# Patient Record
Sex: FEMALE | Race: WHITE | Employment: FULL TIME | URBAN - METROPOLITAN AREA
[De-identification: names, ages, dates, MRNs, and addresses within clinical notes are randomized per-mention and may not be internally consistent; named-entity substitution may affect disease eponyms.]

---

## 2017-05-04 PROCEDURE — 88175 CYTOPATH C/V AUTO FLUID REDO: CPT | Performed by: OBSTETRICS & GYNECOLOGY

## 2017-11-22 PROCEDURE — 86696 HERPES SIMPLEX TYPE 2 TEST: CPT | Performed by: OBSTETRICS & GYNECOLOGY

## 2017-11-22 PROCEDURE — 36415 COLL VENOUS BLD VENIPUNCTURE: CPT | Performed by: OBSTETRICS & GYNECOLOGY

## 2017-11-22 PROCEDURE — 86695 HERPES SIMPLEX TYPE 1 TEST: CPT | Performed by: OBSTETRICS & GYNECOLOGY

## 2018-06-15 PROCEDURE — 87480 CANDIDA DNA DIR PROBE: CPT | Performed by: PHYSICIAN ASSISTANT

## 2018-06-15 PROCEDURE — 87660 TRICHOMONAS VAGIN DIR PROBE: CPT | Performed by: PHYSICIAN ASSISTANT

## 2018-06-15 PROCEDURE — 87510 GARDNER VAG DNA DIR PROBE: CPT | Performed by: PHYSICIAN ASSISTANT

## 2018-06-28 PROCEDURE — 87480 CANDIDA DNA DIR PROBE: CPT | Performed by: PHYSICIAN ASSISTANT

## 2018-06-28 PROCEDURE — 87660 TRICHOMONAS VAGIN DIR PROBE: CPT | Performed by: PHYSICIAN ASSISTANT

## 2018-06-28 PROCEDURE — 87510 GARDNER VAG DNA DIR PROBE: CPT | Performed by: PHYSICIAN ASSISTANT

## 2018-06-28 PROCEDURE — 87086 URINE CULTURE/COLONY COUNT: CPT | Performed by: PHYSICIAN ASSISTANT

## 2018-06-28 PROCEDURE — 87147 CULTURE TYPE IMMUNOLOGIC: CPT | Performed by: PHYSICIAN ASSISTANT

## 2018-07-06 PROCEDURE — 88175 CYTOPATH C/V AUTO FLUID REDO: CPT | Performed by: OBSTETRICS & GYNECOLOGY

## 2020-12-16 ENCOUNTER — TELEPHONE (OUTPATIENT)
Dept: INTERNAL MEDICINE CLINIC | Facility: HOSPITAL | Age: 30
End: 2020-12-16

## 2020-12-16 ENCOUNTER — NURSE ONLY (OUTPATIENT)
Dept: LAB | Facility: HOSPITAL | Age: 30
End: 2020-12-16
Attending: PREVENTIVE MEDICINE

## 2020-12-16 DIAGNOSIS — Z20.822 SUSPECTED 2019 NOVEL CORONAVIRUS INFECTION: Primary | ICD-10-CM

## 2020-12-16 DIAGNOSIS — Z20.822 SUSPECTED 2019 NOVEL CORONAVIRUS INFECTION: ICD-10-CM

## 2020-12-16 PROCEDURE — 87426 SARSCOV CORONAVIRUS AG IA: CPT

## 2020-12-16 NOTE — TELEPHONE ENCOUNTER
Triage note made due to unfound. Results and RTW guidelines:    COVID RESULT GIVEN:      Test type:    [x] Rapid         []       [x] NEGATIVE     Ordered  retest?  []Yes   [x] No (skip to RTW)       Date ordered:  N/A            Dated to be taken:  N/A    If Yes, PLACE ORDER NOW and instruct the following:  [x] Originally Symptomatic or Now Symptoms: RTW when sx improve- fever free for 24 hours w/o medications, Diarrhea/Vomiting for 24 hours w/o medications     [] Originally  Asymptomatic and continues to be Asymptomatic: Ok to work, continue to monitor for sx  [] Positive        Notes: employee stated that she gets migraines and want to see if this improves. RTW PLAN:    [] RTW 10 days with clearance from [facility]- call for appt 2 days prior to RTW date  [] RTW immediately, continue to monitor for sx  [x] RTW when sx improve- fever free for 24 hours w/o medications, Diarrhea/Vomiting for 24 hours w/o medications  []  ordered; continue to monitor sx and call for new/worsening sx.   Discuss RTW guidelines with manager  [x] May continue to work  [] Follow up with PCP  [] Home until further instruction from hotline with  results  INSTRUCTIONS PROVIDED:  [x]  Plan as noted above  []  Length of time to obtain results  []  Quarantine instructions  []  S/S of worsening infection/condition and importance of prompt medical re-evaluation including when to seek emergency care    Estimated RTW date:  12/16/20    [x] The employee voiced understanding of above plan/instructions  [x] Manager Notified/Labor  Notified, if pertinent

## 2020-12-23 ENCOUNTER — TELEPHONE (OUTPATIENT)
Dept: NEUROLOGY | Facility: CLINIC | Age: 30
End: 2020-12-23

## 2020-12-23 ENCOUNTER — LAB ENCOUNTER (OUTPATIENT)
Dept: LAB | Facility: HOSPITAL | Age: 30
End: 2020-12-23
Attending: FAMILY MEDICINE
Payer: COMMERCIAL

## 2020-12-23 DIAGNOSIS — R42 DIZZINESS: ICD-10-CM

## 2020-12-23 DIAGNOSIS — N91.2 AMENORRHEA: ICD-10-CM

## 2020-12-23 DIAGNOSIS — M54.2 CERVICALGIA: Primary | ICD-10-CM

## 2020-12-23 DIAGNOSIS — M79.18 MYOFASCIAL PAIN: ICD-10-CM

## 2020-12-23 PROCEDURE — 82746 ASSAY OF FOLIC ACID SERUM: CPT

## 2020-12-23 PROCEDURE — 82607 VITAMIN B-12: CPT

## 2020-12-23 PROCEDURE — 36415 COLL VENOUS BLD VENIPUNCTURE: CPT

## 2020-12-23 NOTE — TELEPHONE ENCOUNTER
Wes Hilario has chronic neck pain. Exam reveals paracervical, trap and scalene tenderness.  Rx for PT sent

## 2021-01-05 ENCOUNTER — ORDER TRANSCRIPTION (OUTPATIENT)
Dept: PHYSICAL THERAPY | Facility: HOSPITAL | Age: 31
End: 2021-01-05

## 2021-01-05 DIAGNOSIS — M79.18 MYOFASCIAL PAIN: ICD-10-CM

## 2021-01-05 DIAGNOSIS — M54.2 CERVICALGIA: Primary | ICD-10-CM

## 2021-01-18 ENCOUNTER — TELEPHONE (OUTPATIENT)
Dept: PHYSICAL THERAPY | Facility: HOSPITAL | Age: 31
End: 2021-01-18

## 2021-01-19 ENCOUNTER — APPOINTMENT (OUTPATIENT)
Dept: PHYSICAL THERAPY | Facility: HOSPITAL | Age: 31
End: 2021-01-19
Attending: PHYSICAL MEDICINE & REHABILITATION

## 2021-01-22 ENCOUNTER — APPOINTMENT (OUTPATIENT)
Dept: PHYSICAL THERAPY | Facility: HOSPITAL | Age: 31
End: 2021-01-22
Attending: PHYSICAL MEDICINE & REHABILITATION
Payer: COMMERCIAL

## 2021-01-29 ENCOUNTER — OFFICE VISIT (OUTPATIENT)
Dept: PHYSICAL THERAPY | Facility: HOSPITAL | Age: 31
End: 2021-01-29
Attending: PHYSICAL MEDICINE & REHABILITATION
Payer: COMMERCIAL

## 2021-01-29 DIAGNOSIS — M54.2 CERVICALGIA: ICD-10-CM

## 2021-01-29 DIAGNOSIS — M79.18 MYOFASCIAL PAIN: ICD-10-CM

## 2021-01-29 PROCEDURE — 97530 THERAPEUTIC ACTIVITIES: CPT

## 2021-01-29 PROCEDURE — 97162 PT EVAL MOD COMPLEX 30 MIN: CPT

## 2021-01-29 PROCEDURE — 97112 NEUROMUSCULAR REEDUCATION: CPT

## 2021-01-29 NOTE — PROGRESS NOTES
\"Ruth\" Spidel SPINE EVALUATION:   Referring Physician: Dr. Caty Malin  Diagnosis: Radha Postin B R>L, RUE symptoms with wrist numbness/tingling in 1-4th digits, HA/migraine, TMD R>L, and vertigo    Date of Service: 1/29/2021     PATIENT [address] [facility] pain.    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   OP sitting more, IP was moving more at [place] [address]  Recreation/Activity:Y oga, dada, rock climbing,     B neck pain R>L  • Pt describes symptom level current 0/10, at best 0/10, at worst muscle tightness B UT/suboccipitals. 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   Signs and symptoms are consiste cervical flexor test x   Cervical flexor endurance test 5 sec   Normative data scores in healthy adults - Men: 38.9 seconds, Women: 29.4 seconds     Today’s Treatment and Response:   Pt education was provided on exam findings, treatment diagnosis, treatmen Short Term Goals: (to be met in 4-8 visits)   • Pt will be independent and compliant with comprehensive HEP to maintain progress achieved in PT    · Pt will improve cervical AROM extension  to improve to 100% tolerance for putting dishes into overhead ca the need for these services furnished under this plan of treatment and while under my care.     X___________________________________________________ Date____________________    Certification From: 0/[id number]/6301  To:4/29/2021

## 2021-02-05 ENCOUNTER — OFFICE VISIT (OUTPATIENT)
Dept: PHYSICAL THERAPY | Facility: HOSPITAL | Age: 31
End: 2021-02-05
Attending: PHYSICAL MEDICINE & REHABILITATION
Payer: COMMERCIAL

## 2021-02-05 PROCEDURE — 97140 MANUAL THERAPY 1/> REGIONS: CPT

## 2021-02-05 PROCEDURE — 97112 NEUROMUSCULAR REEDUCATION: CPT

## 2021-02-05 PROCEDURE — 97110 THERAPEUTIC EXERCISES: CPT

## 2021-02-05 NOTE — PROGRESS NOTES
PHYSICAL THERAPY DAILY TREATMENT NOTE  Nba Garner  28year old  female  Diagnosis: Cervicalgia B R>L, RUE symptoms with wrist numbness/tingling in 1-4th digits, HA/migraine, TMD R>L, and vertigo     Insurance (Authorized # of Visits):     [employer] performed with pain)  Flexion: 2 fingers, 1 finger with OP pain in UTs and shoulder blades  Extension: 50%, 100% with OP  Sidebending: R 10%; L 25%  (spurling R local pain)  Rotation: R 75%;  L 75%, OP painfree •  •  •  •  •    NV=next visit, AMRAP= as many standing >30 minutes for work and home activities   Charges: 1TE, 1MT, 1NMR       Total Timed Treatment: 45 min  Total Treatment Time: 45 min

## 2021-02-15 ENCOUNTER — APPOINTMENT (OUTPATIENT)
Dept: PHYSICAL THERAPY | Facility: HOSPITAL | Age: 31
End: 2021-02-15
Attending: PHYSICAL MEDICINE & REHABILITATION
Payer: COMMERCIAL

## 2021-02-19 ENCOUNTER — OFFICE VISIT (OUTPATIENT)
Dept: PHYSICAL THERAPY | Facility: HOSPITAL | Age: 31
End: 2021-02-19
Attending: PHYSICAL MEDICINE & REHABILITATION
Payer: COMMERCIAL

## 2021-02-19 PROCEDURE — 97112 NEUROMUSCULAR REEDUCATION: CPT

## 2021-02-19 PROCEDURE — 97530 THERAPEUTIC ACTIVITIES: CPT

## 2021-02-19 PROCEDURE — 97140 MANUAL THERAPY 1/> REGIONS: CPT

## 2021-02-19 NOTE — PROGRESS NOTES
PHYSICAL THERAPY DAILY TREATMENT NOTE  Lashawn Garner  28year old  female  Diagnosis: Cervicalgia B R>L, RUE symptoms with wrist numbness/tingling in 1-4th digits, HA/migraine, TMD R>L, and vertigo     Insurance (Authorized # of Visits):     [employer] roof of mouth 2x10  • Pushup flat/decline, serratus plus CKC, serratus plus OKC 5# 2x10 ea B •  •  •    Subjective Comparable signs •  •  •  •  •  •    Objective comparable signs • Cervical flex endurance 5 sec  • ttp subox, jaw pain reproduced  • Mid trap putting dishes into overhead cabinets   · Pt will improve cervical AROM rotation to >75% to improve tolerance for turning head to check blind spot while driving  · Pt will report decreased frequency of headaches to <1-3x/week    Long Term Goals (to be met

## 2021-02-26 ENCOUNTER — OFFICE VISIT (OUTPATIENT)
Dept: PHYSICAL THERAPY | Facility: HOSPITAL | Age: 31
End: 2021-02-26
Attending: INTERNAL MEDICINE
Payer: COMMERCIAL

## 2021-02-26 PROCEDURE — 97140 MANUAL THERAPY 1/> REGIONS: CPT

## 2021-02-26 PROCEDURE — 97110 THERAPEUTIC EXERCISES: CPT

## 2021-02-26 NOTE — PROGRESS NOTES
PHYSICAL THERAPY DAILY TREATMENT NOTE  Romelia Angelucci A Spidel  28year old  female  Diagnosis: Cervicalgia B R>L, RUE symptoms with wrist numbness/tingling in 1-4th digits, HA/migraine, TMD R>L, and vertigo     Insurance (Authorized # of Visits):     [employer] endurance 5 sec  • ttp subox, jaw pain reproduced  • Mid trap: R 4/5  • Low trap: R 4/5  • Scratch IR: R 7cm deficit;  • C2 hypo and causing familiar HA to T7 then hypermobile  Cervical AROM: (* denotes performed with pain)  Flexion: 2 fingers, 1 finger wi of headaches to <1-3x/week    Long Term Goals (to be met in 8-14 visits)   · Pt will have improved thoracic PA mobility to WNL to improve cervical ROM as well as promote upright posturing and decreased pain with prolonged sitting at work   · Pt will demons

## 2021-03-05 ENCOUNTER — OFFICE VISIT (OUTPATIENT)
Dept: PHYSICAL THERAPY | Facility: HOSPITAL | Age: 31
End: 2021-03-05
Attending: PHYSICAL MEDICINE & REHABILITATION
Payer: COMMERCIAL

## 2021-03-05 PROCEDURE — 97140 MANUAL THERAPY 1/> REGIONS: CPT

## 2021-03-05 PROCEDURE — 97110 THERAPEUTIC EXERCISES: CPT

## 2021-03-05 NOTE — PROGRESS NOTES
PHYSICAL THERAPY DAILY TREATMENT NOTE  Edin Garner  28year old  female  Diagnosis: Cervicalgia B R>L, RUE symptoms with wrist numbness/tingling in 1-4th digits, HA/migraine, TMD R>L, and vertigo     Insurance (Authorized # of Visits):     [employer] 2x10  • Pushup flat/decline, serratus plus CKC, serratus plus OKC 5# 2x10 ea B •  •  •    Subjective Comparable signs •  •  •  •  •  •    Objective comparable signs • Cervical flex endurance 5 sec  • ttp subox, jaw pain reproduced  • Mid trap: R 4/5  • Low into overhead cabinets   · Pt will improve cervical AROM rotation to >75% to improve tolerance for turning head to check blind spot while driving  · Pt will report decreased frequency of headaches to <1-3x/week    Long Term Goals (to be met in 8-14 visits)

## 2021-03-12 ENCOUNTER — APPOINTMENT (OUTPATIENT)
Dept: PHYSICAL THERAPY | Facility: HOSPITAL | Age: 31
End: 2021-03-12
Attending: PHYSICAL MEDICINE & REHABILITATION
Payer: COMMERCIAL

## 2021-03-26 ENCOUNTER — OFFICE VISIT (OUTPATIENT)
Dept: PHYSICAL THERAPY | Facility: HOSPITAL | Age: 31
End: 2021-03-26
Attending: PHYSICAL MEDICINE & REHABILITATION
Payer: COMMERCIAL

## 2021-03-26 PROCEDURE — 97110 THERAPEUTIC EXERCISES: CPT

## 2021-03-26 PROCEDURE — 97140 MANUAL THERAPY 1/> REGIONS: CPT

## 2021-03-26 NOTE — PROGRESS NOTES
PHYSICAL THERAPY DAILY TREATMENT NOTE  Teetee Garner  28year old  female  Diagnosis: Cervicalgia B R>L, RUE symptoms with wrist numbness/tingling in 1-4th digits, HA/migraine, TMD R>L, and vertigo     Insurance (Authorized # of Visits):     [employer] mob, masseter/temporalis active release  • 28' • SOR, R C2 PPIVMS Gr 3-4  • 20'   Neuromuscular Re-education • DNF endurance  • Chin tucks  • No moneys gtb  • 15' • Unilateral row 2x6-8 9# ea  • pulldown btb 2x15 btb • Restricted opening with tongue roof o (new).  No LBP, LUE clear    Short Term Goals: (to be met in 4-8 visits)   • Pt will be independent and compliant with comprehensive HEP to maintain progress achieved in PT    · Pt will improve cervical AROM extension  to improve to 100% tolerance for putt

## 2021-03-29 ENCOUNTER — APPOINTMENT (OUTPATIENT)
Dept: PHYSICAL THERAPY | Facility: HOSPITAL | Age: 31
End: 2021-03-29
Attending: PHYSICAL MEDICINE & REHABILITATION
Payer: COMMERCIAL

## 2021-04-14 ENCOUNTER — LAB ENCOUNTER (OUTPATIENT)
Dept: LAB | Age: 31
End: 2021-04-14
Attending: PREVENTIVE MEDICINE
Payer: COMMERCIAL

## 2021-04-14 ENCOUNTER — TELEPHONE (OUTPATIENT)
Dept: INTERNAL MEDICINE CLINIC | Facility: HOSPITAL | Age: 31
End: 2021-04-14

## 2021-04-14 DIAGNOSIS — Z20.822 SUSPECTED COVID-19 VIRUS INFECTION: ICD-10-CM

## 2021-04-14 DIAGNOSIS — Z20.822 SUSPECTED COVID-19 VIRUS INFECTION: Primary | ICD-10-CM

## 2021-04-14 NOTE — TELEPHONE ENCOUNTER
Results and RTW guidelines:    COVID RESULT GIVEN:      Test type:    [x] Rapid         [] Alinity      [x] NEGATIVE     Ordered Alinity retest?  []Yes   [x] No    Notes: Cough, Sore throat, laryngitis, voice is coming back .  This is common per pt when

## 2021-04-14 NOTE — TELEPHONE ENCOUNTER
Department: [address] [facility]                           [] [facility]  []DELON   [x] [address] [facility]    Dept Manager/Supervisor/team or clinical lead: Kristel Valdez    Position:  [] MD     [x] RN     [] Respiratory Therapist     [] PCT     [] Other:  Office T 4/13/2021  When are you next scheduled to work? Today, 4/14/21 but called in. Did you have close contact with someone on your unit while not wearing a mask? (e.g., during meal breaks):  Yes []   No [x]    If yes, who:  Do you share a workspace?  Yes [x] instructions  [x]  S/S of worsening infection/condition and importance of prompt medical re-evaluation including when to seek emergency care. [x] The employee voiced understanding    Encouraged to reach out to her PCP for symptom support.

## 2021-04-23 ENCOUNTER — APPOINTMENT (OUTPATIENT)
Dept: PHYSICAL THERAPY | Facility: HOSPITAL | Age: 31
End: 2021-04-23
Attending: PHYSICAL MEDICINE & REHABILITATION
Payer: COMMERCIAL

## 2021-07-14 ENCOUNTER — TELEPHONE (OUTPATIENT)
Dept: INTERNAL MEDICINE CLINIC | Facility: HOSPITAL | Age: 31
End: 2021-07-14

## 2021-07-14 ENCOUNTER — NURSE ONLY (OUTPATIENT)
Dept: LAB | Age: 31
End: 2021-07-14
Attending: PREVENTIVE MEDICINE
Payer: COMMERCIAL

## 2021-07-14 ENCOUNTER — HOSPITAL ENCOUNTER (OUTPATIENT)
Age: 31
Discharge: STILL ADMITTED OR ADMINISTRATIVE | End: 2021-07-14
Attending: PREVENTIVE MEDICINE
Payer: COMMERCIAL

## 2021-07-14 DIAGNOSIS — Z20.822 SUSPECTED COVID-19 VIRUS INFECTION: Primary | ICD-10-CM

## 2021-07-14 DIAGNOSIS — Z20.822 SUSPECTED COVID-19 VIRUS INFECTION: ICD-10-CM

## 2021-07-14 LAB — SARS-COV-2 RNA RESP QL NAA+PROBE: NOT DETECTED

## 2021-07-14 NOTE — TELEPHONE ENCOUNTER
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 days  When was the last shift you worked? 7/14/21 - sent home d/t being ill  When are you next scheduled to work?  7/15/21    Did you have close contact with someone on your unit while not wearing a m and importance of prompt medical re-evaluation including when to seek emergency care.    [x] The employee voiced understanding

## 2021-07-15 NOTE — TELEPHONE ENCOUNTER
Results and RTW guidelines:    COVID RESULT GIVEN:      Test type:    [x] Rapid         [] Alinity      [x] NEGATIVE     Ordered Alinity retest?  []Yes   [x] No (skip to RTW)    Notes: Test results seen in Sammy last evening and Dung returned to w

## 2021-07-23 ENCOUNTER — IMMUNIZATION (OUTPATIENT)
Dept: LAB | Facility: HOSPITAL | Age: 31
End: 2021-07-23
Attending: EMERGENCY MEDICINE
Payer: COMMERCIAL

## 2021-07-23 DIAGNOSIS — Z23 NEED FOR VACCINATION: Primary | ICD-10-CM

## 2021-07-23 PROCEDURE — 0001A SARSCOV2 VAC 30MCG/0.3ML IM: CPT

## 2021-08-13 ENCOUNTER — IMMUNIZATION (OUTPATIENT)
Dept: LAB | Facility: HOSPITAL | Age: 31
End: 2021-08-13
Attending: PREVENTIVE MEDICINE
Payer: COMMERCIAL

## 2021-08-13 DIAGNOSIS — Z23 NEED FOR VACCINATION: Primary | ICD-10-CM

## 2021-08-13 PROCEDURE — 0002A SARSCOV2 VAC 30MCG/0.3ML IM: CPT

## 2021-11-09 ENCOUNTER — APPOINTMENT (OUTPATIENT)
Dept: OTHER | Facility: HOSPITAL | Age: 31
End: 2021-11-09
Attending: ORTHOPAEDIC SURGERY

## 2021-11-10 ENCOUNTER — TELEPHONE (OUTPATIENT)
Dept: PHYSICAL THERAPY | Facility: HOSPITAL | Age: 31
End: 2021-11-10

## 2021-11-10 ENCOUNTER — TELEPHONE (OUTPATIENT)
Dept: PHYSICAL THERAPY | Age: 31
End: 2021-11-10

## 2021-11-10 ENCOUNTER — ORDER TRANSCRIPTION (OUTPATIENT)
Dept: PHYSICAL THERAPY | Facility: HOSPITAL | Age: 31
End: 2021-11-10

## 2021-11-10 DIAGNOSIS — S46.819A TRAPEZIUS STRAIN: ICD-10-CM

## 2021-11-10 DIAGNOSIS — S37.69XA: Primary | ICD-10-CM

## 2021-11-11 ENCOUNTER — OFFICE VISIT (OUTPATIENT)
Dept: PHYSICAL THERAPY | Facility: HOSPITAL | Age: 31
End: 2021-11-11
Attending: ORTHOPAEDIC SURGERY
Payer: OTHER MISCELLANEOUS

## 2021-11-11 ENCOUNTER — APPOINTMENT (OUTPATIENT)
Dept: PHYSICAL THERAPY | Age: 31
End: 2021-11-11
Attending: ORTHOPAEDIC SURGERY
Payer: OTHER MISCELLANEOUS

## 2021-11-11 DIAGNOSIS — S46.811A STRAIN OF RIGHT TRAPEZIUS MUSCLE, INITIAL ENCOUNTER: ICD-10-CM

## 2021-11-11 DIAGNOSIS — S37.69XA ABRASION OF CERVIX, INITIAL ENCOUNTER: ICD-10-CM

## 2021-11-11 PROCEDURE — 97162 PT EVAL MOD COMPLEX 30 MIN: CPT

## 2021-11-11 PROCEDURE — 97140 MANUAL THERAPY 1/> REGIONS: CPT

## 2021-11-11 PROCEDURE — 97110 THERAPEUTIC EXERCISES: CPT

## 2021-11-12 ENCOUNTER — OFFICE VISIT (OUTPATIENT)
Dept: PHYSICAL THERAPY | Facility: HOSPITAL | Age: 31
End: 2021-11-12
Attending: ORTHOPAEDIC SURGERY
Payer: OTHER MISCELLANEOUS

## 2021-11-12 DIAGNOSIS — S37.69XD: ICD-10-CM

## 2021-11-12 DIAGNOSIS — S46.811D STRAIN OF RIGHT TRAPEZIUS MUSCLE, SUBSEQUENT ENCOUNTER: ICD-10-CM

## 2021-11-12 PROCEDURE — 97110 THERAPEUTIC EXERCISES: CPT

## 2021-11-12 PROCEDURE — 97140 MANUAL THERAPY 1/> REGIONS: CPT

## 2021-11-12 NOTE — PROGRESS NOTES
Dx: Cervical abrasion (S37.69XA)  Trapezius strain (E55.469M)               Insurance (Authorized # of Visits):  6 (WORKERS COMP)  Authorizing Physician: Dr. Rosalba Florence   Next MD visit:  None  Fall Risk: none   Precautions: none             Subjective: VAS 5- Charges: MT 2, EX 2       Total Timed Treatment: MT 25 min, EX 25 min  Total Treatment Time: 50 min

## 2021-11-12 NOTE — PROGRESS NOTES
SPINE EVALUATION:   Referring Physician: Dr. Rena Crocker  Diagnosis: Cervical abrasion (A15.21WG)  Trapezius strain (S82.727L)     Date of Service: 11/11/2021     PATIENT Jayda Leahy is a 28year old female who presents to therapy today wit scapular weakness. Functional deficits include but are not limited to disrupted sleep, limited OH reaching, difficulty turning head, difficulty driving.   Signs and symptoms are consistent with diagnosis of cervical strain and shoulder impingement with rib bilat  Transverse ligament test: (-)     Today’s Treatment and Response:   Pt education was provided on exam findings, treatment diagnosis, treatment plan, expectations, and prognosis.  Pt was also provided recommendations for activity modifications, possib course of care. Thank you for your referral. Please co-sign or sign and return this letter via fax as soon as possible to [phone].  If you have any questions, please contact me at Dept: [phone]    Sincerely,  Electronically signed by therapist:

## 2021-11-16 ENCOUNTER — OFFICE VISIT (OUTPATIENT)
Dept: PHYSICAL THERAPY | Facility: HOSPITAL | Age: 31
End: 2021-11-16
Attending: ORTHOPAEDIC SURGERY
Payer: OTHER MISCELLANEOUS

## 2021-11-16 DIAGNOSIS — S37.69XD: ICD-10-CM

## 2021-11-16 DIAGNOSIS — S46.811D STRAIN OF RIGHT TRAPEZIUS MUSCLE, SUBSEQUENT ENCOUNTER: ICD-10-CM

## 2021-11-16 PROCEDURE — 97110 THERAPEUTIC EXERCISES: CPT

## 2021-11-16 PROCEDURE — 97140 MANUAL THERAPY 1/> REGIONS: CPT

## 2021-11-16 NOTE — PROGRESS NOTES
Dx: Cervical abrasion (S37.69XA)  Trapezius strain (J68.553A)               Insurance (Authorized # of Visits):  6 (WORKERS COMP)  Authorizing Physician: Dr. Jose David Nunez   Next MD visit:  None  Fall Risk: none   Precautions: none             Subjective: VAS 3- pulleys x20 to unload RUE · Biofeedback Chin Tucks x10 hold 10 sec  · Open books x15 bilat  · Supine neck rotation x20 bilat, in axis  · Prone on elbows neck retraction x10 hold 10 sec      NMRE ·        HEP · Added touch downs and open books Added Prone o

## 2021-11-19 ENCOUNTER — APPOINTMENT (OUTPATIENT)
Dept: OTHER | Facility: HOSPITAL | Age: 31
End: 2021-11-19
Attending: EMERGENCY MEDICINE

## 2021-11-19 ENCOUNTER — OFFICE VISIT (OUTPATIENT)
Dept: PHYSICAL THERAPY | Facility: HOSPITAL | Age: 31
End: 2021-11-19
Attending: ORTHOPAEDIC SURGERY
Payer: OTHER MISCELLANEOUS

## 2021-11-19 DIAGNOSIS — S37.69XD: ICD-10-CM

## 2021-11-19 DIAGNOSIS — S46.811D STRAIN OF RIGHT TRAPEZIUS MUSCLE, SUBSEQUENT ENCOUNTER: ICD-10-CM

## 2021-11-19 PROCEDURE — 97140 MANUAL THERAPY 1/> REGIONS: CPT

## 2021-11-19 PROCEDURE — 97110 THERAPEUTIC EXERCISES: CPT

## 2021-11-19 NOTE — PROGRESS NOTES
Dx: Cervical abrasion (S37.69XA)  Trapezius strain (D75.855H)               Insurance (Authorized # of Visits):  6 (WORKERS COMP)  Authorizing Physician: Dr. Frias Doing   Next MD visit:  None  Fall Risk: none   Precautions: none             Subjective: VAS 3/ harmonics  · Upper and mid T/S manip  · PIVMS L C5-7 up and fwd  · FMP supine cervical rotation tissue technique · CT junction harmonics  · Upper and mid T/S manip  · PIVMS L C5-7 up and fwd  · FMP supine cervical rotation tissue technique     EX · MET R s

## 2021-11-22 ENCOUNTER — OFFICE VISIT (OUTPATIENT)
Dept: PHYSICAL THERAPY | Facility: HOSPITAL | Age: 31
End: 2021-11-22
Attending: ORTHOPAEDIC SURGERY
Payer: OTHER MISCELLANEOUS

## 2021-11-22 PROCEDURE — 97110 THERAPEUTIC EXERCISES: CPT

## 2021-11-22 PROCEDURE — 97140 MANUAL THERAPY 1/> REGIONS: CPT

## 2021-11-23 NOTE — PROGRESS NOTES
Dx: Cervical abrasion (S37.69XA)  Trapezius strain (O71.493N)               Insurance (Authorized # of Visits):  6 (WORKERS COMP)  Authorizing Physician: Dr. Baker Runner   Next MD visit:  None  Fall Risk: none   Precautions: none             Subjective: Neck 3 C5-7 up and fwd  · FMP supine cervical rotation tissue technique · CT junction harmonics  · Upper and mid T/S manip  · PIVMS L C5-7 up and fwd  · FMP supine cervical rotation tissue technique · Prone mid T/S and R rib UPA gr 3  · Scap upward rotation mobs

## 2021-11-24 ENCOUNTER — APPOINTMENT (OUTPATIENT)
Dept: PHYSICAL THERAPY | Facility: HOSPITAL | Age: 31
End: 2021-11-24
Attending: ORTHOPAEDIC SURGERY
Payer: OTHER MISCELLANEOUS

## 2021-11-29 ENCOUNTER — TELEPHONE (OUTPATIENT)
Dept: PHYSICAL THERAPY | Facility: HOSPITAL | Age: 31
End: 2021-11-29

## 2021-11-29 ENCOUNTER — APPOINTMENT (OUTPATIENT)
Dept: PHYSICAL THERAPY | Facility: HOSPITAL | Age: 31
End: 2021-11-29
Attending: ORTHOPAEDIC SURGERY
Payer: OTHER MISCELLANEOUS

## 2021-11-29 ENCOUNTER — NURSE ONLY (OUTPATIENT)
Dept: LAB | Facility: HOSPITAL | Age: 31
End: 2021-11-29
Attending: PREVENTIVE MEDICINE
Payer: COMMERCIAL

## 2021-11-29 ENCOUNTER — TELEPHONE (OUTPATIENT)
Dept: INTERNAL MEDICINE CLINIC | Facility: HOSPITAL | Age: 31
End: 2021-11-29

## 2021-11-29 DIAGNOSIS — Z20.822 SUSPECTED COVID-19 VIRUS INFECTION: ICD-10-CM

## 2021-11-29 DIAGNOSIS — Z20.822 SUSPECTED COVID-19 VIRUS INFECTION: Primary | ICD-10-CM

## 2021-11-29 LAB — SARS-COV-2 RNA RESP QL NAA+PROBE: NOT DETECTED

## 2021-11-29 NOTE — TELEPHONE ENCOUNTER
Results and RTW guidelines:    COVID RESULT:    [x] Viewed by employee in [address] RTW plan and instructions as indicated on triage call. Manager notified. Estimated RTW date:   [] Discussed with employee   [] Unable to reach by phone.   Sent via [employer] worsen                - If outside testing completed, bring a copy of result to RTW appointment      Notes:     RTW PLAN:    [] RTW 10 days with clearance from [facility]- call for appt 1-2 days prior to RTW date OR when feeling well enough to RTW (see guidelines

## 2021-11-29 NOTE — TELEPHONE ENCOUNTER
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    Dept Manager/Supervisor/team or clinical lead: demetrio gay      Position:  [] MD     [x] RN     [] Respiratory Therapist     [] PCT     [] PSR      [] Other 11/24/2021  When are you next scheduled to work? 11/30/2021    Did you have close contact with someone on your unit while not wearing a mask? (e.g., during meal breaks):  Yes []   No [x]    If yes, who:   Do you share a workspace?  Yes []   No [x]       If COVID-19 testing ordered: [x] Rapid    [] Alinity    Date test is to be taken:    11/29/2021    []  Outside testing       [x] Manager notified    INSTRUCTIONS PROVIDED:    [x] Employee will schedule testing via [employer] or jamin

## 2021-11-30 ENCOUNTER — APPOINTMENT (OUTPATIENT)
Dept: OTHER | Facility: HOSPITAL | Age: 31
End: 2021-11-30
Attending: EMERGENCY MEDICINE

## 2021-12-02 ENCOUNTER — OFFICE VISIT (OUTPATIENT)
Dept: PHYSICAL THERAPY | Facility: HOSPITAL | Age: 31
End: 2021-12-02
Attending: ORTHOPAEDIC SURGERY
Payer: OTHER MISCELLANEOUS

## 2021-12-02 PROCEDURE — 97140 MANUAL THERAPY 1/> REGIONS: CPT

## 2021-12-02 PROCEDURE — 97110 THERAPEUTIC EXERCISES: CPT

## 2021-12-02 NOTE — PROGRESS NOTES
Progress Summary  Pt has attended 6 visits in Physical Therapy    Dx: Cervical abrasion (S37.69XA)  Trapezius strain (A52.211I)               Insurance (Authorized # of Visits):  6 (WORKERS COMP)  Authorizing Physician: Dr. Marrie Hodgkin   Next MD visit:  None 11/12/2021  TX#: 2/6 Date:  11/16/2021           TX#: 3/6 Date: 11/19/2021             TX#: 4/6 Date:  11/22/2021               TX#: 5/6 Date: 12/2/2021  Tx#: 6/6   MT · CT junction harmonics  · PIVMS L C5-7 up and fwd  · FMP supine cervical rotation tissu NMRE ·        HEP · Added touch downs and open books Added Prone on elbow neck retraction holds Reviewed touch downs. Added air touch downs.   Added sh abduction wall wipes       Charges: MT, EX 3      Total Timed Treatment: MT 10 min, EX 38 min  Total T

## 2021-12-14 ENCOUNTER — TELEPHONE (OUTPATIENT)
Dept: PHYSICAL THERAPY | Facility: HOSPITAL | Age: 31
End: 2021-12-14

## 2021-12-15 ENCOUNTER — OFFICE VISIT (OUTPATIENT)
Dept: PHYSICAL THERAPY | Facility: HOSPITAL | Age: 31
End: 2021-12-15
Attending: ORTHOPAEDIC SURGERY
Payer: COMMERCIAL

## 2021-12-15 PROCEDURE — 97140 MANUAL THERAPY 1/> REGIONS: CPT

## 2021-12-15 PROCEDURE — 97110 THERAPEUTIC EXERCISES: CPT

## 2021-12-16 ENCOUNTER — APPOINTMENT (OUTPATIENT)
Dept: OTHER | Facility: HOSPITAL | Age: 31
End: 2021-12-16
Attending: EMERGENCY MEDICINE

## 2021-12-16 NOTE — PROGRESS NOTES
Progress Summary  Pt has attended 6 visits in Physical Therapy    Dx: Cervical abrasion (S37.69XA)  Trapezius strain (O62.143G)               Insurance (Authorized # of Visits):  6 (WORKERS COMP)  Authorizing Physician: Dr. Rex Barrios   Next MD visit:  None 11/12/2021  TX#: 2/6 Date:  11/16/2021           TX#: 3/6 Date: 11/19/2021             TX#: 4/6 Date:  11/22/2021               TX#: 5/6 Date: 12/2/2021  Tx#: 6/6      MT · CT junction harmonics  · PIVMS L C5-7 up and fwd  · FMP supine cervical rotation ti wipes 3x15 · Serratus MET with upward rotation holds. · Quadruped rocking x15 with serratus  · S/L R sh abduction with scapular upward rotation 2x10  · Touch down wall slide 3x5 with deep breath and shrug  · Serratus ball rolls 3x15 eccentric lowering.

## 2022-01-10 ENCOUNTER — TELEPHONE (OUTPATIENT)
Dept: INTERNAL MEDICINE CLINIC | Facility: HOSPITAL | Age: 32
End: 2022-01-10

## 2022-01-10 NOTE — TELEPHONE ENCOUNTER
Outside Covid Testing done    Results and RTW guidelines:    COVID RESULT reported:      Test type:    [x] Rapid outside         [] PCR outside       [] Home Test    Date of test: 1/8/2022    Test location: [facility]     [x] Result viewed in Epic with verbal con communication open with management about RTW and if symptoms worsen     Notes:  Pt lost taste and smell. No other sx. Taste returned quickly , but still no smell. Rapid test negative. Instructed to call back if symptoms persist or worsen.     RTW PLAN: RECEIVED THE COVID-19 Vaccine?  Yes [x]    No []          If yes, date(s) received:     7/23/2021 8/13/2021     Which vaccine:  Pfizer [x]     Landon Yost []    J&J []          Date of symptom onset: 1/7/2022  SYMPTOMS:  [] asymptomatic    • Fever > 100F sick at home?      [] Yes    [x] No   If yes, explain:       NOTES: (narrative documentation)

## 2022-01-19 ENCOUNTER — TELEPHONE (OUTPATIENT)
Dept: INTERNAL MEDICINE CLINIC | Facility: HOSPITAL | Age: 32
End: 2022-01-19

## 2022-01-19 NOTE — TELEPHONE ENCOUNTER
Outside Covid Testing done    Results and RTW guidelines:    COVID RESULT reported:      Test type:    [] Rapid outside         [x] PCR outside       [] Home Test    Date of test: 1/19/22     Test location: [facility]         [] Result viewed in Epic with verbal should quarantine at home for at least 5 days from sx onset, follow the CDC guidelines for cleaning and quarantining; see CDC. 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 Day shift  When was the last shift you worked?  1/18/22  When are you next scheduled to work? 1/2/22    Did you have close contact with someone on your unit wh

## 2022-01-25 NOTE — TELEPHONE ENCOUNTER
Scheduled to work tomorrow 1/26/2022 but still coughing and fatigued. Instructed to call Employee Health,phone no.provided.

## 2022-05-09 ENCOUNTER — HOSPITAL ENCOUNTER (OUTPATIENT)
Dept: MRI IMAGING | Age: 32
Discharge: HOME | End: 2022-05-09
Attending: UROLOGY
Payer: COMMERCIAL

## 2022-05-09 DIAGNOSIS — N28.1 RENAL CYST: ICD-10-CM

## 2022-05-09 PROCEDURE — 74183 MRI ABD W/O CNTR FLWD CNTR: CPT | Performed by: UROLOGY

## 2022-05-09 PROCEDURE — A9575 INJ GADOTERATE MEGLUMI 0.1ML: HCPCS | Performed by: UROLOGY

## 2022-11-02 ENCOUNTER — TELEPHONE (OUTPATIENT)
Dept: PHYSICAL THERAPY | Facility: HOSPITAL | Age: 32
End: 2022-11-02

## 2022-11-02 ENCOUNTER — OFFICE VISIT (OUTPATIENT)
Dept: PHYSICAL MEDICINE AND REHAB | Facility: CLINIC | Age: 32
End: 2022-11-02
Payer: COMMERCIAL

## 2022-11-02 VITALS
SYSTOLIC BLOOD PRESSURE: 112 MMHG | BODY MASS INDEX: 21.17 KG/M2 | DIASTOLIC BLOOD PRESSURE: 76 MMHG | WEIGHT: 124 LBS | HEART RATE: 85 BPM | OXYGEN SATURATION: 98 % | HEIGHT: 64 IN

## 2022-11-02 DIAGNOSIS — M54.2 CERVICALGIA: Primary | ICD-10-CM

## 2022-11-02 DIAGNOSIS — M75.42 IMPINGEMENT SYNDROME OF LEFT SHOULDER: ICD-10-CM

## 2022-11-02 DIAGNOSIS — M79.18 MYOFASCIAL PAIN: ICD-10-CM

## 2022-11-02 PROCEDURE — 3074F SYST BP LT 130 MM HG: CPT | Performed by: PHYSICAL MEDICINE & REHABILITATION

## 2022-11-02 PROCEDURE — 3008F BODY MASS INDEX DOCD: CPT | Performed by: PHYSICAL MEDICINE & REHABILITATION

## 2022-11-02 PROCEDURE — 99204 OFFICE O/P NEW MOD 45 MIN: CPT | Performed by: PHYSICAL MEDICINE & REHABILITATION

## 2022-11-02 PROCEDURE — 3078F DIAST BP <80 MM HG: CPT | Performed by: PHYSICAL MEDICINE & REHABILITATION

## 2022-11-02 RX ORDER — TIZANIDINE 2 MG/1
TABLET ORAL NIGHTLY
Qty: 60 TABLET | Refills: 0 | Status: SHIPPED | OUTPATIENT
Start: 2022-11-02 | End: 2022-12-02

## 2022-11-02 RX ORDER — METHYLPREDNISOLONE 4 MG/1
TABLET ORAL
Qty: 1 EACH | Refills: 0 | Status: SHIPPED | OUTPATIENT
Start: 2022-11-02

## 2022-11-03 ENCOUNTER — OFFICE VISIT (OUTPATIENT)
Dept: PHYSICAL THERAPY | Facility: HOSPITAL | Age: 32
End: 2022-11-03
Attending: PHYSICAL MEDICINE & REHABILITATION
Payer: COMMERCIAL

## 2022-11-03 DIAGNOSIS — M54.2 CERVICALGIA: ICD-10-CM

## 2022-11-03 DIAGNOSIS — M75.42 IMPINGEMENT SYNDROME OF LEFT SHOULDER: ICD-10-CM

## 2022-11-03 DIAGNOSIS — M79.18 MYOFASCIAL PAIN: ICD-10-CM

## 2022-11-03 PROCEDURE — 97161 PT EVAL LOW COMPLEX 20 MIN: CPT

## 2022-11-03 PROCEDURE — 97110 THERAPEUTIC EXERCISES: CPT

## 2022-11-03 PROCEDURE — 97140 MANUAL THERAPY 1/> REGIONS: CPT

## 2022-11-16 ENCOUNTER — OFFICE VISIT (OUTPATIENT)
Dept: PHYSICAL THERAPY | Facility: HOSPITAL | Age: 32
End: 2022-11-16
Attending: PHYSICAL MEDICINE & REHABILITATION
Payer: COMMERCIAL

## 2022-11-16 PROCEDURE — 97110 THERAPEUTIC EXERCISES: CPT

## 2022-11-16 PROCEDURE — 97140 MANUAL THERAPY 1/> REGIONS: CPT

## 2022-11-16 NOTE — PROGRESS NOTES
Diagnosis:   Cervicalgia (M54.2)  Myofascial pain (M79.18)  Impingement syndrome of left shoulder (M75.42)      Referring Provider: Anai Sepulveda  Date of Evaluation:   11/3/2022    Precautions:  No neck manipulations Next MD visit:   none scheduled  Date of Surgery: n/a   Insurance Primary/Secondary: [place] / N/A     # Auth Visits: 12            Subjective: Reports neck feeling better not as painful, mainly stiff to the L.     Pain: 2/10 L upper trap region      Objective:     Cervical AROM: (* denotes performed with pain)  Flexion: 22 tight  Extension: 53  Sidebending: R 22 tight; L 22 tight  Rotation: R 70; L 45 L upper trap pain improved to 63 L post session. Accessory motion restrictions: C1-2 up and FWD R, C2-3 up and FWD L, CT junction gapping, L 1st and 2nd ribs inferior glides  Palpation: TTP along CT junction paraspinals and suboccipitals. Assessment: Improving AROM into L rotation. Primarily limited by jt restrictions along CT junction and 1st and 2nd ribs which continues to responded well to manual therapy and self mobilizations. Goals: (to be met in 12 visits)   Be able to view blind spots pain-free to drive safely. Be able to sleep through the night without waking due to pain. Be able to look down without difficulty and max 3/10 pain to perform chores at home. [employer] with HEP. Plan: Continue manual therapy to address jt restrictions, progress neck stabilization, and incorporate corrective exercises to improve scapular and neck mechanics    Date: 11/16/2022  TX#: 2/12 Date:                 TX#: 3/ Date:                 TX#: 4/ Date:                 TX#: 5/ Date:    Tx#: 6/   MT  -Prone CT junction gapping gr 3  -CT Junction harmonics  -PIVMS lower C/S R rotation gr 3  -L 1st and 2nd rib inferior glide gr 3  -FMP supine cervical rotation tissue technique         EX  -MWM CT junction flex/ext seated  -End range L neck rotation isometrics  -Seated neck rotation L towel stretch x10 hold 5 sec  -Seated neck rotation L with chin tuck at end range x10 hold 5 sec  -Supine in axis neck rotation x20 bilat  -1/2 kneeling T/S rotation against wall x10 hold 3 sec              HEP - 1/2 kneeling thoracic rotation         Charges: MT, EX 2       Total Timed Treatment: MT 15 min, EX 25 min  Total Treatment Time: 40 min

## 2022-11-29 ENCOUNTER — TELEPHONE (OUTPATIENT)
Dept: INTERNAL MEDICINE CLINIC | Facility: HOSPITAL | Age: 32
End: 2022-11-29

## 2022-11-29 DIAGNOSIS — Z20.822 SUSPECTED COVID-19 VIRUS INFECTION: Primary | ICD-10-CM

## 2022-11-30 ENCOUNTER — LAB ENCOUNTER (OUTPATIENT)
Dept: LAB | Age: 32
End: 2022-11-30
Attending: PREVENTIVE MEDICINE
Payer: COMMERCIAL

## 2022-11-30 DIAGNOSIS — Z20.822 SUSPECTED COVID-19 VIRUS INFECTION: ICD-10-CM

## 2022-11-30 LAB — SARS-COV-2 RNA RESP QL NAA+PROBE: NOT DETECTED

## 2022-11-30 NOTE — TELEPHONE ENCOUNTER
Results and RTW guidelines:    COVID RESULT:    [x] Viewed by employee in [address] RTW plan and instructions as indicated on triage call. Manager notified. Estimated RTW date:   [] Discussed with employee   [] Unable to reach by phone.   Sent via [employer] message      Test type:    [x] Rapid         [] Alinity         [] Outside test:       [x] NEGATIVE     Ordered Alinity retest?  []Yes   [x] No (skip to RTW)   Ordered Rapid retest?   []Yes   [x] No (skip to RTW)           Dated to be taken:      If Yes, PLACE ORDER NOW and instruct the following:  -Originally Symptomatic or Now Symptoms:   -RTW when sx improve- fever free for 24 hours w/o medications, Diarrhea/Vomiting for 24 hours w/o medications    -Originally  Asymptomatic  -Asymptomatic AND Vaccinated or Unvaccinated or Prior infection in past 90 days:   -May work and continue to monitor symptoms for the next 14 days.                                         -Rapid test day 2, rapid test day 5 (day 0 - exposure)

## 2022-12-01 ENCOUNTER — TELEPHONE (OUTPATIENT)
Dept: PHYSICAL THERAPY | Facility: HOSPITAL | Age: 32
End: 2022-12-01

## 2022-12-02 ENCOUNTER — APPOINTMENT (OUTPATIENT)
Dept: PHYSICAL THERAPY | Facility: HOSPITAL | Age: 32
End: 2022-12-02
Attending: PHYSICAL MEDICINE & REHABILITATION
Payer: COMMERCIAL

## 2022-12-05 ENCOUNTER — APPOINTMENT (OUTPATIENT)
Dept: PHYSICAL THERAPY | Facility: HOSPITAL | Age: 32
End: 2022-12-05
Attending: PHYSICAL MEDICINE & REHABILITATION
Payer: COMMERCIAL

## 2022-12-07 ENCOUNTER — OFFICE VISIT (OUTPATIENT)
Dept: PHYSICAL THERAPY | Facility: HOSPITAL | Age: 32
End: 2022-12-07
Attending: PHYSICAL MEDICINE & REHABILITATION
Payer: COMMERCIAL

## 2022-12-07 PROCEDURE — 97110 THERAPEUTIC EXERCISES: CPT

## 2022-12-07 PROCEDURE — 97140 MANUAL THERAPY 1/> REGIONS: CPT

## 2022-12-07 NOTE — PROGRESS NOTES
Diagnosis:   Cervicalgia (M54.2)  Myofascial pain (M79.18)  Impingement syndrome of left shoulder (M75.42)      Referring Provider: Jessica Davila  Date of Evaluation:   11/3/2022    Precautions:  No neck manipulations Next MD visit:   none scheduled  Date of Surgery: n/a   Insurance Primary/Secondary: [place] / N/[place]     # Auth Visits: 12            Subjective: Reports neck is doing much better. Able to yoga for exercise without any flare ups. Looking up and end range rotation most stiff and uncomfortable. Pain: 2/10 L upper trap region      Objective:     Cervical AROM: (* denotes performed with pain)  Flexion: 35 tight  Extension: 60 tight  Sidebending: R 25 tight; L 25 tight  Rotation: R 70; L 60 L upper trap pain improved to 75 seg bilat    Accessory motion restrictions: C1-2 up and FWD R, C2-3 and C5-6 up and FWD L, CT junction gapping. Palpation: TTP along CT junction paraspinals and suboccipitals. Assessment: Mild restrictions along L C/S, resolved with mobilizations. Mobility into rotation and extension WNL. Able to progress neck stabilization exercises to improve endurance and stability on mobility without any adverse effects. Goals: (to be met in 12 visits)   Be able to view blind spots pain-free to drive safely. Be able to sleep through the night without waking due to pain. Be able to look down without difficulty and max 3/10 pain to perform chores at home. [place] with HEP. Plan: Continue manual therapy to address jt restrictions, progress neck stabilization, and incorporate corrective exercises to improve scapular and neck mechanics. Date: 11/16/2022  TX#: 2/12 Date: 12/7/2022   TX#: 3/12 Date:                 TX#: 4/ Date:                 TX#: 5/ Date:    Tx#: 6/   MT  -Prone CT junction gapping gr 3  -CT Junction harmonics  -PIVMS lower C/S R rotation gr 3  -L 1st and 2nd rib inferior glide gr 3  -FMP supine cervical rotation tissue technique   MT  -CT Junction harmonics  -CT Junction STM  -PIVMS lower C/S R rotation gr 3  -FMP supine cervical rotation tissue technique  -R C4-6 lateral glides with MET      EX  -MWM CT junction flex/ext seated  -End range L neck rotation isometrics  -Seated neck rotation L towel stretch x10 hold 5 sec  -Seated neck rotation L with chin tuck at end range x10 hold 5 sec  -Supine in axis neck rotation x20 bilat  -1/2 kneeling T/S rotation against wall x10 hold 3 sec EX  -MWM CT junction flex/ext seated  -chin tucks with lift x20 hold 5 sec  -Prone in axis neck rotation 2x10 bilat  -1/2 kneeling T/S rotation against wall x10 hold 3 sec  -pivot prones 2x10             HEP - 1/2 kneeling thoracic rotation HEP - added pivot prone and SYD neck rotation        Charges: MT, EX 2       Total Timed Treatment: MT 15 min, EX 25 min  Total Treatment Time: 40 min

## 2023-02-14 ENCOUNTER — OFFICE VISIT (OUTPATIENT)
Dept: FAMILY MEDICINE CLINIC | Facility: CLINIC | Age: 33
End: 2023-02-14
Payer: COMMERCIAL

## 2023-02-14 VITALS
WEIGHT: 123.81 LBS | TEMPERATURE: 100 F | OXYGEN SATURATION: 97 % | SYSTOLIC BLOOD PRESSURE: 116 MMHG | HEIGHT: 64 IN | BODY MASS INDEX: 21.14 KG/M2 | DIASTOLIC BLOOD PRESSURE: 74 MMHG | RESPIRATION RATE: 16 BRPM

## 2023-02-14 DIAGNOSIS — N92.6 IRREGULAR PERIODS/MENSTRUAL CYCLES: ICD-10-CM

## 2023-02-14 DIAGNOSIS — R68.89 FLU-LIKE SYMPTOMS: Primary | ICD-10-CM

## 2023-02-14 LAB
CONTROL LINE PRESENT WITH A CLEAR BACKGROUND (YES/NO): YES YES/NO
PREGNANCY TEST, URINE: NEGATIVE

## 2023-02-14 PROCEDURE — 81025 URINE PREGNANCY TEST: CPT | Performed by: NURSE PRACTITIONER

## 2023-02-14 PROCEDURE — 3008F BODY MASS INDEX DOCD: CPT | Performed by: NURSE PRACTITIONER

## 2023-02-14 PROCEDURE — 3078F DIAST BP <80 MM HG: CPT | Performed by: NURSE PRACTITIONER

## 2023-02-14 PROCEDURE — 3074F SYST BP LT 130 MM HG: CPT | Performed by: NURSE PRACTITIONER

## 2023-02-14 PROCEDURE — 99213 OFFICE O/P EST LOW 20 MIN: CPT | Performed by: NURSE PRACTITIONER

## 2023-02-14 PROCEDURE — 87637 SARSCOV2&INF A&B&RSV AMP PRB: CPT | Performed by: NURSE PRACTITIONER

## 2023-02-14 RX ORDER — ONDANSETRON 4 MG/1
4 TABLET, FILM COATED ORAL EVERY 8 HOURS PRN
Qty: 20 TABLET | Refills: 2 | Status: SHIPPED | OUTPATIENT
Start: 2023-02-14

## 2023-02-14 NOTE — PATIENT INSTRUCTIONS
May take Pepto-bismol or Imodium or Lomotil to slow diarrhea and minimize fluid loss through colon. Rest as much as possible  Try to drink lots of fluids. Start with small sips of water every 15 minutes as long as you can keep fluid down. Avoid fruit juices and soda. Try water, Pedialyte, and/or 1/2 strength Gatorade/Power Aid. Drink 1-2 oz. Every hour when tolerated. Goal is to drink 1500-2000ml per day. Advance diet slowly as tolerate: start with clear liquids (jello, broth) then small amounts of bland foods. It's best to try bananas, rice, applesauce, toast/dry cereal, crackers, and bread. Resume normal diet as soon as tolerated  Avoid milk products (except yogurt) until symptoms resolve. Avoid eating foods with a lot of fat or sugar, which can make symptoms worse. If vomiting recurs, allow stomach to rest briefly (2hrs) then start slowly again as above. If unable to hold down fluids, then go to the ER. Go to the Emergency Department if any worsening of condition for moderate to severe abdominal pain, flank pain, signs of dehydration, persistent vomiting or diarrhea, blood in stool or vomit, difficulty urinating or having a bowel movement,  new onset of fever, or any other new or concerning symptoms.   If you currently have a fever:  see your primary care physician if it does not resolve in next 72 hours  No school/work until fever free for 24 hours

## 2023-02-15 LAB
FLUAV + FLUBV RNA SPEC NAA+PROBE: NOT DETECTED
FLUAV + FLUBV RNA SPEC NAA+PROBE: NOT DETECTED
RSV RNA SPEC NAA+PROBE: NOT DETECTED
SARS-COV-2 RNA RESP QL NAA+PROBE: NOT DETECTED

## 2023-02-16 ENCOUNTER — HOSPITAL ENCOUNTER (EMERGENCY)
Facility: HOSPITAL | Age: 33
Discharge: HOME | End: 2023-02-16
Attending: EMERGENCY MEDICINE
Payer: COMMERCIAL

## 2023-02-16 VITALS
RESPIRATION RATE: 18 BRPM | TEMPERATURE: 98 F | HEART RATE: 79 BPM | DIASTOLIC BLOOD PRESSURE: 84 MMHG | OXYGEN SATURATION: 99 % | HEIGHT: 64 IN | BODY MASS INDEX: 21 KG/M2 | WEIGHT: 123 LBS | SYSTOLIC BLOOD PRESSURE: 107 MMHG

## 2023-02-16 DIAGNOSIS — R55 SYNCOPE AND COLLAPSE: Primary | ICD-10-CM

## 2023-02-16 DIAGNOSIS — R42 VERTIGO: ICD-10-CM

## 2023-02-16 LAB
ATRIAL RATE: 73 BPM
P AXIS: 67 DEGREES
P-R INTERVAL: 122 MS
Q-T INTERVAL: 354 MS
QRS DURATION: 78 MS
QTC CALCULATION (BEZET): 389 MS
R AXIS: 61 DEGREES
T AXIS: 43 DEGREES
VENTRICULAR RATE: 73 BPM

## 2023-02-16 PROCEDURE — 99284 EMERGENCY DEPT VISIT MOD MDM: CPT

## 2023-02-16 PROCEDURE — 96360 HYDRATION IV INFUSION INIT: CPT

## 2023-02-16 PROCEDURE — 93005 ELECTROCARDIOGRAM TRACING: CPT

## 2023-02-16 PROCEDURE — 93010 ELECTROCARDIOGRAM REPORT: CPT

## 2023-02-16 NOTE — ED QUICK NOTES
Pt  had a syncopal episodes 2 days ago.  was seen in ER, treated with fluids and released. States today had another syncopal episodes with no LOC. States had n/v yesterday, but today having dizziness and weakness.

## 2023-02-16 NOTE — ED INITIAL ASSESSMENT (HPI)
Pt reports having a syncope episode today at Pt reports no LOC. Pt states she was in the ER two days ago for the same thing. Pt reports V/N yesterday. Pt states she feels very weak.

## 2023-02-16 NOTE — ED QUICK NOTES
Pt states she clamped off her IV fluids, due to feeling \"puffy\" in her hands. Pt received approx 400cc of NS.

## 2023-03-13 ENCOUNTER — OFFICE VISIT (OUTPATIENT)
Dept: OBGYN CLINIC | Facility: CLINIC | Age: 33
End: 2023-03-13

## 2023-03-13 VITALS
SYSTOLIC BLOOD PRESSURE: 107 MMHG | BODY MASS INDEX: 21 KG/M2 | DIASTOLIC BLOOD PRESSURE: 73 MMHG | HEART RATE: 91 BPM | WEIGHT: 122 LBS

## 2023-03-13 DIAGNOSIS — Z31.9 PATIENT DESIRES PREGNANCY: ICD-10-CM

## 2023-03-13 DIAGNOSIS — Z01.419 WELL WOMAN EXAM: Primary | ICD-10-CM

## 2023-03-13 PROCEDURE — 99385 PREV VISIT NEW AGE 18-39: CPT | Performed by: OBSTETRICS & GYNECOLOGY

## 2023-03-13 PROCEDURE — 3078F DIAST BP <80 MM HG: CPT | Performed by: OBSTETRICS & GYNECOLOGY

## 2023-03-13 PROCEDURE — 3074F SYST BP LT 130 MM HG: CPT | Performed by: OBSTETRICS & GYNECOLOGY

## 2023-05-22 ENCOUNTER — TELEPHONE (OUTPATIENT)
Dept: OBGYN CLINIC | Facility: CLINIC | Age: 33
End: 2023-05-22

## 2023-05-22 NOTE — TELEPHONE ENCOUNTER
Pt confirms LMP 4/15/23 and 30 day cycles. +hpt. Pt agrees to PN appt rotation with our 2 male and 4 female providers. OBN appt scheduled. Advised pt to start taking PNV with iron, folic acid and dha.

## 2023-06-09 ENCOUNTER — NURSE ONLY (OUTPATIENT)
Dept: OBGYN CLINIC | Facility: CLINIC | Age: 33
End: 2023-06-09

## 2023-06-09 DIAGNOSIS — Z34.01 ENCOUNTER FOR SUPERVISION OF NORMAL FIRST PREGNANCY IN FIRST TRIMESTER: Primary | ICD-10-CM

## 2023-06-09 RX ORDER — CHOLECALCIFEROL (VITAMIN D3) 25 MCG
1 TABLET,CHEWABLE ORAL DAILY
COMMUNITY

## 2023-06-09 NOTE — PROGRESS NOTES
Pt seen for OBN appt today with no complaints. Normal PN labs ordered. Pt advised all labs must be completed and resulted prior to MD appt. Pt scheduled NPN appt with MD. Pt is interested in having 1st trimester screening done through [facility]. 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 MEDICAL HISTORY    Anemia No    Anesthetic complications No    Anxiety/Depression  Yes Anxiety-tried Lexapro, didn't help   Autoimmune Disorder No    Asthma  Yes Has not used inhaler in quite awhile   Cancer No    Diabetes  No    Gyne/breast Surgery No    Heart Disease No    Hepatitis/Liver Disease  No    History of blood transfusion No    History of abnormal pap No    Hypertension  No    Infertility  No    Kidney Disease/Frequent UTIs  Yes For the past several years has had recurrent UTI's; last UTI was in 2021   Medication Allergies Yes Several; Listed in allergy section. Latex Allergies Yes Sensitive to latex   Food Allergies  Yes Allergic to preservative that is common in seafood; pt avoids seafood. 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 TB exposure  No    Thyroid Dysfunction No    Trauma/Violence  No    Uterine Anomaly  No    Uterine Fibroids  No    Variocosities/DVTs No    Smoker No    Drug usage in prior year No    Alcohol Yes Occasional prior to pregnancy   Would you accept a blood transfusion? If no, are you a Temple?  Yes                INFECTION HISTORY    Chlamydia No    Pt or partner have hx of Genital Herpes No    Gonorrhea No    Hepatitis B No    HIV No    HPV No    MRSA No    Syphilis No    Tattoos Yes 1 tattoo   Live with someone or Exposed to TB No    Rash or viral illness since LMP No    Varicella Yes When younger; had chicken pox in 3rd grade   Pets Yes 1 dog and 2 cats- Brigida Luciano has already started changing cats' litterbox. GENETICS SCREENING    Genetic Screening    Genetic Screening/Teratology Counseling- Includes patient, baby's father, or anyone in either family with:  Patient's age 28 years or older as of estimated date of delivery: No   Thalassemia ([facility], [place], [address], or  background):  MCV less than 80: No   Neural tube defect (Meningomyelocele, Spina bifida, or Anencephaly): No   Congenital heart defect: No   Down syndrome: Yes (Comment: biological cousin of pt)   Nadeem-Sachs ([address] [facility], [facility], [place]): No   Canavan disease (Ashkenazi Sikh): No   Familial dysautonomia (Ashkenazi Sikh): No   Sickle cell disease or trait (): No   Hemophilia or other blood disorders: No   Muscular dystrophy: No   Cystic fibrosis: No   Archuleta's chorea: No   Intellectual disability and/or autism: No   Other inherited genetic or chromosomal disorder: Yes (Comment: ADHD maternal side; dyslexia)   Patient or baby's father had child with birth defects not listed above: No   Recurrent pregnancy loss, or a stillbirth: Yes (Comment: maternal grandma had hx of miscarriage)   Medications (including supplements, vitamins, herbs, or OTC drugs)/illicit/recreational drugs/alcohol since last menstrual period: No                   MISC    Infant vaccinations Yes

## 2023-06-24 ENCOUNTER — LAB ENCOUNTER (OUTPATIENT)
Dept: LAB | Facility: HOSPITAL | Age: 33
End: 2023-06-24
Attending: OBSTETRICS & GYNECOLOGY
Payer: COMMERCIAL

## 2023-06-24 DIAGNOSIS — Z34.01 ENCOUNTER FOR SUPERVISION OF NORMAL FIRST PREGNANCY IN FIRST TRIMESTER: ICD-10-CM

## 2023-06-24 LAB
ANTIBODY SCREEN: NEGATIVE
BASOPHILS # BLD AUTO: 0.03 X10(3) UL (ref 0–0.2)
BASOPHILS NFR BLD AUTO: 0.3 %
DEPRECATED RDW RBC AUTO: 39.7 FL (ref 35.1–46.3)
EOSINOPHIL # BLD AUTO: 0.12 X10(3) UL (ref 0–0.7)
EOSINOPHIL NFR BLD AUTO: 1.3 %
ERYTHROCYTE [DISTWIDTH] IN BLOOD BY AUTOMATED COUNT: 12.5 % (ref 11–15)
HBV SURFACE AG SER-ACNC: 0.18 [IU]/L
HBV SURFACE AG SERPL QL IA: NONREACTIVE
HCT VFR BLD AUTO: 42.1 %
HCV AB SERPL QL IA: NONREACTIVE
HGB BLD-MCNC: 13.9 G/DL
IMM GRANULOCYTES # BLD AUTO: 0.04 X10(3) UL (ref 0–1)
IMM GRANULOCYTES NFR BLD: 0.4 %
LYMPHOCYTES # BLD AUTO: 2.63 X10(3) UL (ref 1–4)
LYMPHOCYTES NFR BLD AUTO: 28.7 %
MCH RBC QN AUTO: 28.8 PG (ref 26–34)
MCHC RBC AUTO-ENTMCNC: 33 G/DL (ref 31–37)
MCV RBC AUTO: 87.2 FL
MONOCYTES # BLD AUTO: 0.53 X10(3) UL (ref 0.1–1)
MONOCYTES NFR BLD AUTO: 5.8 %
NEUTROPHILS # BLD AUTO: 5.82 X10 (3) UL (ref 1.5–7.7)
NEUTROPHILS # BLD AUTO: 5.82 X10(3) UL (ref 1.5–7.7)
NEUTROPHILS NFR BLD AUTO: 63.5 %
PLATELET # BLD AUTO: 261 10(3)UL (ref 150–450)
RBC # BLD AUTO: 4.83 X10(6)UL
RH BLOOD TYPE: POSITIVE
RUBV IGG SER QL: POSITIVE
RUBV IGG SER-ACNC: >500 IU/ML (ref 10–?)
WBC # BLD AUTO: 9.2 X10(3) UL (ref 4–11)

## 2023-06-24 PROCEDURE — 86780 TREPONEMA PALLIDUM: CPT

## 2023-06-24 PROCEDURE — 87389 HIV-1 AG W/HIV-1&-2 AB AG IA: CPT

## 2023-06-24 PROCEDURE — 85025 COMPLETE CBC W/AUTO DIFF WBC: CPT

## 2023-06-24 PROCEDURE — 36415 COLL VENOUS BLD VENIPUNCTURE: CPT

## 2023-06-24 PROCEDURE — 87086 URINE CULTURE/COLONY COUNT: CPT

## 2023-06-24 PROCEDURE — 86901 BLOOD TYPING SEROLOGIC RH(D): CPT

## 2023-06-24 PROCEDURE — 86900 BLOOD TYPING SEROLOGIC ABO: CPT

## 2023-06-24 PROCEDURE — 86850 RBC ANTIBODY SCREEN: CPT

## 2023-06-24 PROCEDURE — 86762 RUBELLA ANTIBODY: CPT

## 2023-06-24 PROCEDURE — 87340 HEPATITIS B SURFACE AG IA: CPT

## 2023-06-24 PROCEDURE — 86803 HEPATITIS C AB TEST: CPT

## 2023-06-26 LAB — T PALLIDUM AB SER QL: NEGATIVE

## 2023-06-28 ENCOUNTER — TELEPHONE (OUTPATIENT)
Dept: OBGYN CLINIC | Facility: CLINIC | Age: 33
End: 2023-06-28

## 2023-06-28 ENCOUNTER — INITIAL PRENATAL (OUTPATIENT)
Dept: OBGYN CLINIC | Facility: CLINIC | Age: 33
End: 2023-06-28

## 2023-06-28 VITALS
SYSTOLIC BLOOD PRESSURE: 102 MMHG | DIASTOLIC BLOOD PRESSURE: 68 MMHG | WEIGHT: 127.38 LBS | HEART RATE: 91 BPM | BODY MASS INDEX: 22 KG/M2

## 2023-06-28 DIAGNOSIS — Z36.9 FIRST TRIMESTER SCREENING: Primary | ICD-10-CM

## 2023-06-28 DIAGNOSIS — Z34.91 ENCOUNTER FOR SUPERVISION OF NORMAL PREGNANCY IN FIRST TRIMESTER, UNSPECIFIED GRAVIDITY: Primary | ICD-10-CM

## 2023-06-28 PROBLEM — Z13.79 GENETIC TESTING: Status: ACTIVE | Noted: 2023-06-28

## 2023-06-28 PROBLEM — O23.41: Status: ACTIVE | Noted: 2023-06-28

## 2023-06-28 LAB
APPEARANCE: CLEAR
BILIRUBIN: NEGATIVE
GLUCOSE (URINE DIPSTICK): NEGATIVE MG/DL
KETONES (URINE DIPSTICK): NEGATIVE MG/DL
LEUKOCYTES: NEGATIVE
MULTISTIX LOT#: ABNORMAL NUMERIC
NITRITE, URINE: NEGATIVE
PH, URINE: 7 (ref 4.5–8)
PROTEIN (URINE DIPSTICK): NEGATIVE MG/DL
SPECIFIC GRAVITY: 1.01 (ref 1–1.03)
URINE-COLOR: YELLOW
UROBILINOGEN,SEMI-QN: 0.2 MG/DL (ref 0–1.9)

## 2023-06-28 PROCEDURE — 3074F SYST BP LT 130 MM HG: CPT | Performed by: OBSTETRICS & GYNECOLOGY

## 2023-06-28 PROCEDURE — 3078F DIAST BP <80 MM HG: CPT | Performed by: OBSTETRICS & GYNECOLOGY

## 2023-06-28 PROCEDURE — 81002 URINALYSIS NONAUTO W/O SCOPE: CPT | Performed by: OBSTETRICS & GYNECOLOGY

## 2023-06-29 LAB
C TRACH DNA SPEC QL NAA+PROBE: NEGATIVE
N GONORRHOEA DNA SPEC QL NAA+PROBE: NEGATIVE

## 2023-06-30 LAB — T VAGINALIS RRNA SPEC QL NAA+PROBE: NEGATIVE

## 2023-07-17 ENCOUNTER — TELEPHONE (OUTPATIENT)
Dept: OBGYN CLINIC | Facility: CLINIC | Age: 33
End: 2023-07-17

## 2023-07-17 NOTE — TELEPHONE ENCOUNTER
Received via fax from [address] [place] [address] consult note. Placed on [address] desk for review and sign.

## 2023-07-26 ENCOUNTER — ROUTINE PRENATAL (OUTPATIENT)
Dept: OBGYN CLINIC | Facility: CLINIC | Age: 33
End: 2023-07-26

## 2023-07-26 VITALS
WEIGHT: 129 LBS | HEART RATE: 78 BPM | DIASTOLIC BLOOD PRESSURE: 77 MMHG | SYSTOLIC BLOOD PRESSURE: 114 MMHG | BODY MASS INDEX: 22 KG/M2

## 2023-07-26 DIAGNOSIS — Z34.02 ENCOUNTER FOR SUPERVISION OF NORMAL FIRST PREGNANCY IN SECOND TRIMESTER: Primary | ICD-10-CM

## 2023-07-26 DIAGNOSIS — Z3A.14 14 WEEKS GESTATION OF PREGNANCY: ICD-10-CM

## 2023-07-26 DIAGNOSIS — Z36.89 SCREENING, ANTENATAL, FOR FETAL ANATOMIC SURVEY: ICD-10-CM

## 2023-07-26 LAB
APPEARANCE: CLEAR
GLUCOSE (URINE DIPSTICK): NEGATIVE MG/DL
KETONES (URINE DIPSTICK): NEGATIVE MG/DL
MULTISTIX LOT#: NORMAL NUMERIC
NITRITE, URINE: NEGATIVE
PROTEIN (URINE DIPSTICK): NEGATIVE MG/DL
URINE-COLOR: YELLOW

## 2023-07-26 PROCEDURE — 3078F DIAST BP <80 MM HG: CPT | Performed by: NURSE PRACTITIONER

## 2023-07-26 PROCEDURE — 3074F SYST BP LT 130 MM HG: CPT | Performed by: NURSE PRACTITIONER

## 2023-07-26 PROCEDURE — 81002 URINALYSIS NONAUTO W/O SCOPE: CPT | Performed by: NURSE PRACTITIONER

## 2023-07-26 NOTE — PROGRESS NOTES
08/03/21 1122   Encounter Summary   Services provided to: Patient not available   Referral/Consult From: [address] of [facility] not discussed   Continue Visiting Yes   Complexity of Encounter Low   Length of Encounter 30 minutes   Routine   Type Follow up   Assessment Grieving;Loneliness; Helplessness   Intervention Active listening;Explored feelings, thoughts, concerns;Explored coping resources;Nurtured hope;Sustaining presence/ Ministry of presence   Outcome Receptive;Encouraged;Grieving; Shared life review;Engaged in conversation;Expressed gratitude   Spiritual/Orthodoxy   Type Spiritual support Normal low risk panorama. Had NT with MFM that was normal. No fetal movement, no bleeding, no pelvic pain. Some fatigue and nausea. No vomiting. Some acid reflux and constipation - rev'd measures to alleviate. Has anatomy US scheduled with MFM. Rev'd precautions.

## 2023-08-23 ENCOUNTER — ROUTINE PRENATAL (OUTPATIENT)
Dept: OBGYN CLINIC | Facility: CLINIC | Age: 33
End: 2023-08-23

## 2023-08-23 VITALS
BODY MASS INDEX: 23 KG/M2 | SYSTOLIC BLOOD PRESSURE: 97 MMHG | WEIGHT: 135 LBS | HEART RATE: 85 BPM | DIASTOLIC BLOOD PRESSURE: 62 MMHG

## 2023-08-23 DIAGNOSIS — Z34.02 ENCOUNTER FOR SUPERVISION OF NORMAL FIRST PREGNANCY IN SECOND TRIMESTER: Primary | ICD-10-CM

## 2023-08-23 PROCEDURE — 81002 URINALYSIS NONAUTO W/O SCOPE: CPT | Performed by: OBSTETRICS & GYNECOLOGY

## 2023-08-23 PROCEDURE — 3078F DIAST BP <80 MM HG: CPT | Performed by: OBSTETRICS & GYNECOLOGY

## 2023-08-23 PROCEDURE — 3074F SYST BP LT 130 MM HG: CPT | Performed by: OBSTETRICS & GYNECOLOGY

## 2023-09-08 ENCOUNTER — PATIENT MESSAGE (OUTPATIENT)
Dept: OBGYN CLINIC | Facility: CLINIC | Age: 33
End: 2023-09-08

## 2023-09-08 DIAGNOSIS — M54.42 ACUTE BACK PAIN WITH SCIATICA, LEFT: Primary | ICD-10-CM

## 2023-09-08 DIAGNOSIS — Z34.90 PREGNANCY, UNSPECIFIED GESTATIONAL AGE: ICD-10-CM

## 2023-09-08 NOTE — TELEPHONE ENCOUNTER
20w6d pt reports left low back, sciatic pain rated 5/10 now, but can be up to 10/10. Pain is BL, but worse on the left, originating from coccyx and sacrum and radiating down left leg. This is new since about 2 weeks ago. She states some days she cannot get out of bed, due to difficult transition from sitting to standing. Once she is up and moving, it is more tolerable. Pt states tylenol does help. Heat and stretching also helpful, however she is concerned this will worsen with pregnancy, since already affecting mobility. Pt requesting referral to PT or other recs. To KIET on call to advise.

## 2023-09-08 NOTE — TELEPHONE ENCOUNTER
Without question this could worsen in latter half of pregnancy. Please initiate referral for PT.  I'll add to problem list

## 2023-09-11 ENCOUNTER — TELEPHONE (OUTPATIENT)
Dept: OBGYN CLINIC | Facility: CLINIC | Age: 33
End: 2023-09-11

## 2023-09-13 ENCOUNTER — TELEPHONE (OUTPATIENT)
Dept: PHYSICAL THERAPY | Facility: HOSPITAL | Age: 33
End: 2023-09-13

## 2023-09-18 ENCOUNTER — PATIENT MESSAGE (OUTPATIENT)
Dept: OBGYN CLINIC | Facility: CLINIC | Age: 33
End: 2023-09-18

## 2023-09-18 ENCOUNTER — ROUTINE PRENATAL (OUTPATIENT)
Dept: OBGYN CLINIC | Facility: CLINIC | Age: 33
End: 2023-09-18

## 2023-09-18 VITALS
DIASTOLIC BLOOD PRESSURE: 61 MMHG | SYSTOLIC BLOOD PRESSURE: 98 MMHG | WEIGHT: 140 LBS | BODY MASS INDEX: 24 KG/M2 | HEART RATE: 84 BPM

## 2023-09-18 DIAGNOSIS — Z34.02 ENCOUNTER FOR SUPERVISION OF NORMAL FIRST PREGNANCY IN SECOND TRIMESTER: Primary | ICD-10-CM

## 2023-09-18 LAB
APPEARANCE: CLEAR
GLUCOSE (URINE DIPSTICK): NEGATIVE MG/DL
MULTISTIX LOT#: NORMAL NUMERIC
NITRITE, URINE: NEGATIVE
URINE-COLOR: YELLOW

## 2023-09-18 PROCEDURE — 81002 URINALYSIS NONAUTO W/O SCOPE: CPT | Performed by: OBSTETRICS & GYNECOLOGY

## 2023-09-18 PROCEDURE — 3074F SYST BP LT 130 MM HG: CPT | Performed by: OBSTETRICS & GYNECOLOGY

## 2023-09-18 PROCEDURE — 3078F DIAST BP <80 MM HG: CPT | Performed by: OBSTETRICS & GYNECOLOGY

## 2023-09-22 ENCOUNTER — LAB ENCOUNTER (OUTPATIENT)
Dept: LAB | Age: 33
End: 2023-09-22
Attending: OBSTETRICS & GYNECOLOGY
Payer: COMMERCIAL

## 2023-09-22 ENCOUNTER — PATIENT MESSAGE (OUTPATIENT)
Dept: OBGYN CLINIC | Facility: CLINIC | Age: 33
End: 2023-09-22

## 2023-09-22 ENCOUNTER — TELEPHONE (OUTPATIENT)
Dept: INTERNAL MEDICINE CLINIC | Facility: HOSPITAL | Age: 33
End: 2023-09-22

## 2023-09-22 DIAGNOSIS — U07.1 COVID-19 AFFECTING PREGNANCY, ANTEPARTUM: ICD-10-CM

## 2023-09-22 DIAGNOSIS — Z20.822 SUSPECTED COVID-19 VIRUS INFECTION: Primary | ICD-10-CM

## 2023-09-22 DIAGNOSIS — R09.81 CONGESTION OF NASAL SINUS: ICD-10-CM

## 2023-09-22 DIAGNOSIS — R09.81 CONGESTION OF NASAL SINUS: Primary | ICD-10-CM

## 2023-09-22 DIAGNOSIS — O98.519 COVID-19 AFFECTING PREGNANCY, ANTEPARTUM: ICD-10-CM

## 2023-09-22 PROCEDURE — 87635 SARS-COV-2 COVID-19 AMP PRB: CPT

## 2023-09-22 NOTE — TELEPHONE ENCOUNTER
NJG reviewed [facility] ultrasound report and indicated it was normal. Sent to scanning.

## 2023-09-22 NOTE — TELEPHONE ENCOUNTER
[] [facility]  []DELON   [x] [address] [facility]  Manager : Noemí Melton    HAVE YOU RECEIVED THE COVID-19 Vaccine? Yes [x]    No []          If yes, date(s) received: 07/23/2021; 08/13/2021           Which vaccine:  Pfizer [x]     Caesar Chago []    J&J []      SYMPTOMS (reported via dashboard):  [] asymptomatic  [x] symptomatic  [] GI symptoms only    Symptom onset date: 09/21/2023  Fever   > 100F             Yes []      Cough                          Yes [x]      Shortness of breath  Yes [x]      Congestion                 Yes [x]      Runny nose                Yes [x]        Loss of Smell              Yes []        Loss of Taste             Yes []       Sore throat                 Yes [x]       Fatigue                        Yes [x]       Body Aches                Yes [x]        Chills                           Yes [x]        Headache                   Yes [x]             GI symptoms             Yes []     No [x]                     Nausea   []          Vomiting            []                                    Diarrhea  []          Upset stomach []      Employee has positive COVID Exposure? Yes []     No [x]    Date of exposure:     []  Coworker                       [] patient                        [] Family/friend    Employee has a history of Covid?   Yes []     No [x]   If Yes, when:    When was the last shift you worked?: 09/20/2023      PLAN:     [id number] testing ordered: [] Rapid    [] Alinity              Date test is to be taken:   09/22/2023    [x]  Outside testing                           Notes:    INSTRUCTIONS PROVIDED:    [x]  Employee was instructed to call Central scheduling at [phone] or use [phone] to make an appointment for their testing   [x]  May return to work if employee views negative result in [address] and remains fever, vomiting, and diarrhea free  []  May continue to work if remains asymptomatic and views negative result in [address]  []  Follow up for condition update when resulting  []  If symptoms develop, stay home and call hotline for rapid test order  []  If COVID positive results, off work minimum of 5 days from positive test or onset of symptoms (day 0)    []      On day 5, if asymptomatic or mildly symptomatic (with improving symptoms) may return to work day 6  []      On day 5, if symptomatic, call Employee Health for RTW screening        [x]  Plan noted above  [x]  Length of time to obtain results  []  Quarantine instructions  [x]  S/S of worsening infection/condition and importance of prompt medical re-evaluation including when to seek emergency care.    [x] The employee voiced understanding

## 2023-09-23 LAB — SARS-COV-2 RNA RESP QL NAA+PROBE: DETECTED

## 2023-09-25 PROBLEM — U07.1 COVID-19 AFFECTING PREGNANCY IN SECOND TRIMESTER: Status: ACTIVE | Noted: 2023-09-25

## 2023-09-25 PROBLEM — O98.512 COVID-19 AFFECTING PREGNANCY IN SECOND TRIMESTER: Status: ACTIVE | Noted: 2023-09-25

## 2023-09-25 NOTE — TELEPHONE ENCOUNTER
Spoke to SANDRA at [facility] location states just scheduled pt before I called for growth on Nov,27th. Asked me to keep both orders placed just in case.

## 2023-09-25 NOTE — TELEPHONE ENCOUNTER
Outside Covid Testing done    Results and RTW guidelines:    COVID RESULT reported:      Test type:    [x] Rapid outside         [] PCR outside       [] Home Test    Date of test: 09/23/2023     Test location: [place] [address] [facility]         [x] Result viewed in Epic with verbal consent received from employee     [] Results per Employee Covid Dashboard    [] Best Practice    [] Employee instructed to email copy of result to [email or web address].        [x] Positive  - Employee should quarantine at home for at least 5 days (day 1 is day after sx onset) , follow the CDC guidelines for cleaning and                              quarantining; see CDC.gov   -This employee may RTW on day 6 if asymptomatic or mildly symptomatic (with improving symptoms). Call Employee Health on day 5 if unable to return on                      day 6 after symptom onset.    -This employee needs to call Employee Health on day 5 after symptom onset. The employee needs to be cleared by Employee Health. - If Employee is still experiencing severe symptoms must make a RTW appt with Employee Health, Employee will not be cleared if:    1. Has consistent cough, shortness of breath or fatigue that restricts your physical activities    2. Is still feeling \"unwell\"    3. Within 15 days of hospitalization for COVID    4. Within 20 days of intubation for COVID    5.  Still has a fever, vomiting or diarrhea   - Keep communication open with management about RTW and if symptoms worsen  - Monitor sx and temperature                  - Notify PCP of result                 - Seek emergent care with worsening symptoms       Notes:     RTW PLAN:    [x]  If COVID positive results, off work minimum of 5 days from positive test or onset of symptoms (day 0)        On day 5, if asymptomatic or mildly symptomatic (with improving symptoms) may return to work day 6          On day 5, if symptomatic, call Employee Health for RTW screening        []  COVID positive result - call Employee Health on day 5 after symptom onset. The employee needs to be cleared by Employee Health to RTW. [] RTW immediately, continue to monitor for sx  [] RTW when sx improve; must be fever free for 24 hours w/o medications, Diarrhea/Vomiting free for 24 hours w/o medications  [] Alinity ordered; continue to monitor sx and call for new/worsening sx.   Discuss RTW guidelines with manager  [] May continue to work  [x] Follow up with PCP  [] Home until further instruction from hotline with Alinity results  INSTRUCTIONS PROVIDED:  [x]  Plan as noted above  []  Length of time to obtain results  []  May return to work if views negative in My Chart and  remains fever, vomiting, and diarrhea free  []  May continue to work if remains asymptomatic   [x]  Quarantine instructions  [x]  Masking protocol  []  S/S of worsening infection/condition and importance of prompt medical re-evaluation including when to seek emergency care  [] If symptoms develop, stay home and call hotline for rapid test order    Estimated RTW date:  09/27/2023  [x] Manager notified

## 2023-09-25 NOTE — TELEPHONE ENCOUNTER
Spoke to pt. States has seen MFM for level 2 and everything was within normal limits. Aware growth has been placed to seek care with MFM, asked why advised per mFM protocol if covid+ between 2nd trimters and 32weeks needs growth. Pt verbalized understandings.  Declined number advising she already has number

## 2023-09-25 NOTE — TELEPHONE ENCOUNTER
Received vm from [facility] when on call this past weekend. Pt is 23w2d and tested positive covid. Pt will need to see MFM for consult and growth us. Also per NJG, add covid dx to problem list.   Message to referral pool for MFM referral. Thanks.

## 2023-10-05 ENCOUNTER — APPOINTMENT (OUTPATIENT)
Dept: PHYSICAL THERAPY | Age: 33
End: 2023-10-05
Attending: OBSTETRICS & GYNECOLOGY
Payer: COMMERCIAL

## 2023-10-05 ENCOUNTER — TELEPHONE (OUTPATIENT)
Dept: PHYSICAL THERAPY | Facility: HOSPITAL | Age: 33
End: 2023-10-05

## 2023-10-06 ENCOUNTER — OFFICE VISIT (OUTPATIENT)
Dept: PHYSICAL THERAPY | Age: 33
End: 2023-10-06
Attending: OBSTETRICS & GYNECOLOGY
Payer: COMMERCIAL

## 2023-10-06 DIAGNOSIS — M54.42 ACUTE BACK PAIN WITH SCIATICA, LEFT: Primary | ICD-10-CM

## 2023-10-06 DIAGNOSIS — Z34.90 PREGNANCY, UNSPECIFIED GESTATIONAL AGE: ICD-10-CM

## 2023-10-06 PROCEDURE — 97140 MANUAL THERAPY 1/> REGIONS: CPT

## 2023-10-06 PROCEDURE — 97161 PT EVAL LOW COMPLEX 20 MIN: CPT

## 2023-10-06 PROCEDURE — 97112 NEUROMUSCULAR REEDUCATION: CPT

## 2023-10-11 ENCOUNTER — OFFICE VISIT (OUTPATIENT)
Dept: PHYSICAL THERAPY | Age: 33
End: 2023-10-11
Attending: OBSTETRICS & GYNECOLOGY
Payer: COMMERCIAL

## 2023-10-11 PROCEDURE — 97110 THERAPEUTIC EXERCISES: CPT

## 2023-10-11 PROCEDURE — 97140 MANUAL THERAPY 1/> REGIONS: CPT

## 2023-10-11 PROCEDURE — 97112 NEUROMUSCULAR REEDUCATION: CPT

## 2023-10-11 NOTE — PROGRESS NOTES
Diagnosis:   Back Pain in Pregnancy       Referring Provider: [facility]  Date of Evaluation:    10/6/23    Precautions:   2nd trimester of pregnancy due 1/20/24 Next MD visit:   none scheduled  Date of Surgery: n/a   Insurance Primary/Secondary: [employer] PPO / N/A     # Auth Visits: 10 auth until 1/4/23            Subjective: Patient reports that she has more pain today; she had a hard time sleeping today. Her pain is in the left low back and into the lateral thigh and to the knee. She is not having any pain on the right. Her exercises are going well at home and she is having her  roll out her leg. She is a little more sore but she has worked the last 3 days. She is trying to the squeezes when she does sit to stand. Pain: 5/10      Objective: See treatment log        Assessment: Continue with additional force closure exercises this session and she had neutral pelvic alignment this session. She is moderately tender in the hip rotators and piriformis. She continues to need increased glut and abdominal strength to help manage her lumbopelvic stability; needs reinforcement      Goals:   Goals: (to be met in 10 visits)   1. Patient will be independent in a progressive HEP to help manage symptoms and achieve functional independence in 3 sessions. 2. Patient will decrease his max pain to 5/10 as needed to improve his functional independence in 3 weeks. 3. Patient will be independent with sleep postioning modification to allow her to lay in left side lying and roll in bed. 4. Patient will increase her glut med strength to 4-/5 as needed to improve her lumbopelvic support to stand more than 10 minutes to perform daily chores in her home  5. Patient will increase her low ab strenght to 2/5 as needed to allow her to sit without increased pain to drive to and from work and appointments  6.  Patient will increase her hip extensor strength 4-/5 to decrease her pain with sit to stand     Plan: Continue with additional stability exercises; progress with seated pelvic tilts, figure 4 stretch and multifidus isometrics as tolerated.       Date: 10/11/2023  TX#: 2/10 Date:                 TX#: 3/ Date:                 TX#: 4/ Date:                 TX#: 5/   Man STM to the left gluts, hip rotators and lumbar paraspinals and IASTM left gluts and ITB in side lying x 9 min       Ther ex NuStep x 5 min, legs only level 4  Side lying clams x 15 L with pillow  Seated clams RTB 3 x 10       NMR TA isometric x 15, 3\"  Isometric adduction in sitting x 15, 5\"  Seated LLE nerve glides x 15        Ther act       HEP: LLE nerve glides and seated abduction  Charges: 1 man (12 min), 1 ther ex (14 min), 1 NMR (12 min)       Total Timed Treatment: 38 min  Total Treatment Time: 39 min    Certification From: 27/7/3501  To:1/4/2024

## 2023-10-13 ENCOUNTER — OFFICE VISIT (OUTPATIENT)
Dept: PHYSICAL THERAPY | Age: 33
End: 2023-10-13
Attending: OBSTETRICS & GYNECOLOGY
Payer: COMMERCIAL

## 2023-10-13 PROCEDURE — 97140 MANUAL THERAPY 1/> REGIONS: CPT

## 2023-10-13 PROCEDURE — 97112 NEUROMUSCULAR REEDUCATION: CPT

## 2023-10-13 PROCEDURE — 97110 THERAPEUTIC EXERCISES: CPT

## 2023-10-13 NOTE — PROGRESS NOTES
Diagnosis:   Back Pain in Pregnancy       Referring Provider: [facility]  Date of Evaluation:    10/6/23    Precautions:   2nd trimester of pregnancy due 1/20/24 Next MD visit:   none scheduled  Date of Surgery: n/a   Insurance Primary/Secondary: [employer] PPO / N/A     # Auth Visits: 10 auth until 1/4/23            Subjective: Patient reports that she is having some pain down the leg today; her pain is in the low back and into the hip and leg to the knee. She did a lot of sitting yesterday and that sometimes aggravates her pain in the leg. Pain: 4/ 10      Objective: See treatment log        Assessment: Continued with STM to help decrease stresses in at the SIJ and lower lumbar spine and therefore contributing to pain extending into the LE. Focused on force closure exercises and added pallof punches to help improve her multifidus recruitment for better segmental stability and also to help improve her control of rotation which may be contributing to nerve root irritation. Goals:   Goals: (to be met in 10 visits)   1. Patient will be independent in a progressive HEP to help manage symptoms and achieve functional independence in 3 sessions. 2. Patient will decrease his max pain to 5/10 as needed to improve his functional independence in 3 weeks. 3. Patient will be independent with sleep postioning modification to allow her to lay in left side lying and roll in bed. 4. Patient will increase her glut med strength to 4-/5 as needed to improve her lumbopelvic support to stand more than 10 minutes to perform daily chores in her home  5. Patient will increase her low ab strenght to 2/5 as needed to allow her to sit without increased pain to drive to and from work and appointments  6. Patient will increase her hip extensor strength 4-/5 to decrease her pain with sit to stand     Plan: Continue with additional stability exercises; progress with seated pelvic tilts and SB squats  as tolerated in the next few sessions. Date: 10/11/2023  TX#: 2/10 Date:    10/13/23            TX#: 3/10 Date:                 TX#: 4/ Date:                 TX#: 5/   Man STM to the left gluts, hip rotators and lumbar paraspinals and IASTM left gluts and ITB in side lying x 9 min  STM to the left gluts, hip rotators and lumbar paraspinals and IASTM left gluts and ITB in side lying x 9 min      Ther ex NuStep x 5 min, legs only level 4  Side lying clams x 15 L with pillow  Seated clams RTB 3 x 10  NuStep x 5 min, legs only level 4  Seated figure 4 stretch 3 x 30\"   Side lying clams 2 x 10 L with pillow  Seated clams BTB 3 x 10      NMR TA isometric x 15, 3\"  Isometric adduction in sitting x 15, 5\"  Seated LLE nerve glides x 15   TA isometric x 15, 3\"  Isometric adduction in sitting x 15, 5\"  Seated LLE nerve glides x 15  Pallof punches x 10 R/L RTT     Ther act       HEP: not progressed this visit    Charges: 1 man (12 min), 1 ther ex (15 min), 1 NMR (12 min)       Total Timed Treatment: 39 min  Total Treatment Time: 39 min    Certification From: 20/9/3039  To:1/4/2024

## 2023-10-16 ENCOUNTER — OFFICE VISIT (OUTPATIENT)
Dept: PHYSICAL THERAPY | Age: 33
End: 2023-10-16
Attending: OBSTETRICS & GYNECOLOGY
Payer: COMMERCIAL

## 2023-10-16 PROCEDURE — 97140 MANUAL THERAPY 1/> REGIONS: CPT

## 2023-10-16 PROCEDURE — 97112 NEUROMUSCULAR REEDUCATION: CPT

## 2023-10-16 PROCEDURE — 97110 THERAPEUTIC EXERCISES: CPT

## 2023-10-16 NOTE — PROGRESS NOTES
Diagnosis:   Back Pain in Pregnancy       Referring Provider: [facility]  Date of Evaluation:    10/6/23    Precautions:   2nd trimester of pregnancy due 1/20/24 Next MD visit:   none scheduled  Date of Surgery: n/a   Insurance Primary/Secondary: [employer] PPO / N/A     # Auth Visits: 10 auth until 1/4/23            Subjective: Patient reports that she was on her feet a lot over the weekend while working. She did have a hard time getting off the toilet today because there was pain in the back. She has pain in the hip and back toward the mid thigh; the knee pain is al little bit better than it was     Pain: 6/10      Objective: See treatment log      Assessment: Patient did ave minimally increased tone in the hip rotators and gluts this session which might be contributing to her pain complaints; continued with core stability ad lumbopelvic awarenss to help her manage low back and SI stresses. Added kinesiotap to the abdomen to provide her additional pregnancy support for better postural control. Goals:   Goals: (to be met in 10 visits)   1. Patient will be independent in a progressive HEP to help manage symptoms and achieve functional independence in 3 sessions. 2. Patient will decrease his max pain to 5/10 as needed to improve his functional independence in 3 weeks. 3. Patient will be independent with sleep postioning modification to allow her to lay in left side lying and roll in bed. 4. Patient will increase her glut med strength to 4-/5 as needed to improve her lumbopelvic support to stand more than 10 minutes to perform daily chores in her home  5. Patient will increase her low ab strenght to 2/5 as needed to allow her to sit without increased pain to drive to and from work and appointments  6.  Patient will increase her hip extensor strength 4-/5 to decrease her pain with sit to stand     Plan: Continue with additional stability exercises; progress with seated pelvic tilts and SB squats  as tolerated in the next few sessions.       Date: 10/11/2023  TX#: 2/10 Date:    10/13/23            TX#: 3/10 Date: 10/16/23                TX#: 4/10 Date:                 TX#: 5/   Man STM to the left gluts, hip rotators and lumbar paraspinals and IASTM left gluts and ITB in side lying x 9 min  STM to the left gluts, hip rotators and lumbar paraspinals and IASTM left gluts and ITB in side lying x 9 min  STM to the left gluts, hip rotators and lumbar paraspinals and IASTM left gluts and ITB in side lying x 9 min     Ther ex NuStep x 5 min, legs only level 4  Side lying clams x 15 L with pillow  Seated clams RTB 3 x 10  NuStep x 5 min, legs only level 4  Seated figure 4 stretch 3 x 30\"   Side lying clams 2 x 10 L with pillow  Seated clams BTB 3 x 10  NuStep x 5 min, legs only level 4  Seated figure 4 stretch 3 x 30\"   Side lying clams 2 x 10 L with pillow  Seated clams BTB 3 x 10    KINESIOTAPE: lower abdomen I strip    NMR TA isometric x 15, 3\"  Isometric adduction in sitting x 15, 5\"  Seated LLE nerve glides x 15   TA isometric x 15, 3\"  Isometric adduction in sitting x 15, 5\"  Seated LLE nerve glides x 15  Pallof punches x 10 R/L RTT TA isometric x 15, 3\"  Isometric adduction in sitting x 15, 5\"   seated pelvic tilts  x 15  Seated LLE nerve glides x 15  Pallof punches x 10 R/L RTT ND    Ther act       HEP: Patient educated on use of kinesiotape for their symptoms as well as wear time, precautions, and removal.       Charges: 1 man (12 min), 1 ther ex (15 min), 1 NMR (12 min)       Total Timed Treatment: 39 min  Total Treatment Time: 39 min    Certification From: 17/5/0550  To:1/4/2024

## 2023-10-17 ENCOUNTER — LAB ENCOUNTER (OUTPATIENT)
Dept: LAB | Facility: HOSPITAL | Age: 33
End: 2023-10-17
Attending: OBSTETRICS & GYNECOLOGY
Payer: COMMERCIAL

## 2023-10-17 DIAGNOSIS — Z34.02 ENCOUNTER FOR SUPERVISION OF NORMAL FIRST PREGNANCY IN SECOND TRIMESTER: ICD-10-CM

## 2023-10-17 LAB
DEPRECATED RDW RBC AUTO: 40.6 FL (ref 35.1–46.3)
ERYTHROCYTE [DISTWIDTH] IN BLOOD BY AUTOMATED COUNT: 12.9 % (ref 11–15)
GLUCOSE 1H P GLC SERPL-MCNC: 159 MG/DL
HCT VFR BLD AUTO: 32.7 %
HGB BLD-MCNC: 10.9 G/DL
MCH RBC QN AUTO: 29.1 PG (ref 26–34)
MCHC RBC AUTO-ENTMCNC: 33.3 G/DL (ref 31–37)
MCV RBC AUTO: 87.4 FL
PLATELET # BLD AUTO: 233 10(3)UL (ref 150–450)
RBC # BLD AUTO: 3.74 X10(6)UL
WBC # BLD AUTO: 12.2 X10(3) UL (ref 4–11)

## 2023-10-17 PROCEDURE — 82950 GLUCOSE TEST: CPT

## 2023-10-17 PROCEDURE — 85027 COMPLETE CBC AUTOMATED: CPT

## 2023-10-17 PROCEDURE — 36415 COLL VENOUS BLD VENIPUNCTURE: CPT

## 2023-10-18 ENCOUNTER — TELEPHONE (OUTPATIENT)
Dept: OBGYN CLINIC | Facility: CLINIC | Age: 33
End: 2023-10-18

## 2023-10-18 DIAGNOSIS — Z34.02 ENCOUNTER FOR SUPERVISION OF NORMAL FIRST PREGNANCY IN SECOND TRIMESTER: ICD-10-CM

## 2023-10-18 DIAGNOSIS — Z34.01 ENCOUNTER FOR SUPERVISION OF NORMAL FIRST PREGNANCY IN FIRST TRIMESTER: Primary | ICD-10-CM

## 2023-10-18 NOTE — TELEPHONE ENCOUNTER
----- Message from Sangita Scherer MD sent at 10/17/2023  7:31 PM CDT -----  Needs 3hr GTT.  Also needs iron daily -- recheck CBC & ferritin in 4 wks after starting iron

## 2023-10-19 ENCOUNTER — OFFICE VISIT (OUTPATIENT)
Dept: PHYSICAL THERAPY | Age: 33
End: 2023-10-19
Attending: OBSTETRICS & GYNECOLOGY
Payer: COMMERCIAL

## 2023-10-19 PROCEDURE — 97140 MANUAL THERAPY 1/> REGIONS: CPT

## 2023-10-19 PROCEDURE — 97110 THERAPEUTIC EXERCISES: CPT

## 2023-10-19 PROCEDURE — 97112 NEUROMUSCULAR REEDUCATION: CPT

## 2023-10-19 NOTE — TELEPHONE ENCOUNTER
Informed pt order has been placed for 3 hr gtt and CBC/ Ferritin recheck in 1 month. Explained process for 3 hr gtt and advised pt she will need to fast for 12 hours before test. Pt verbalized understanding. Advised pt that since she will now be taking an additional dose of iron, she can take Colace daily and it is important that she is remaining hydrated at 64 oz water daily. Pt verbalized understanding.

## 2023-10-19 NOTE — PROGRESS NOTES
Diagnosis:   Back Pain in Pregnancy       Referring Provider: [facility]  Date of Evaluation:    10/6/23    Precautions:   2nd trimester of pregnancy due 1/20/24 Next MD visit:   none scheduled  Date of Surgery: n/a   Insurance Primary/Secondary: [employer] [place] / N/A     # Auth Visits: 10 auth until 1/4/23            Subjective: Patient reports that she did not feel well after the last session because she was a little bit fatigued and light headed; she had some blood work done so she is starting on iron. She did her some stretches this morning so she is feeling ok this morning and better than Monday. She wore the tape until Tuesday and then she took it off and she didn't notice too much difference. She does have ain with prolonged sitting at work at the [employer]. Pain: 2-3/10      Objective: See treatment log      Assessment: Patient had less tenderness with manual interventions this session> Continued with strengthening and postural awarenss to help her control of the SIJ and also the lumbar spine as her posture continues to change during her pregnancy. Need to continue to reinforce force closure to allow her improved stability during dynamic ADLS and position changes       Goals:   Goals: (to be met in 10 visits)   1. Patient will be independent in a progressive HEP to help manage symptoms and achieve functional independence in 3 sessions. 2. Patient will decrease his max pain to 5/10 as needed to improve his functional independence in 3 weeks. 3. Patient will be independent with sleep postioning modification to allow her to lay in left side lying and roll in bed. 4. Patient will increase her glut med strength to 4-/5 as needed to improve her lumbopelvic support to stand more than 10 minutes to perform daily chores in her home  5. Patient will increase her low ab strenght to 2/5 as needed to allow her to sit without increased pain to drive to and from work and appointments  6.  Patient will increase her hip extensor strength 4-/5 to decrease her pain with sit to stand     Plan: Continue with additional stability exercises; progress SB squats and resisted shoulder extension  as tolerated in the next few sessions.       Date:    10/13/23            TX#: 3/10 Date: 10/16/23                TX#: 4/10 Date: 10/19/2023                TX#: 5/10    Man STM to the left gluts, hip rotators and lumbar paraspinals and IASTM left gluts and ITB in side lying x 9 min  STM to the left gluts, hip rotators and lumbar paraspinals and IASTM left gluts and ITB in side lying x 9 min  STM to the left gluts, hip rotators and lumbar paraspinals and IASTM left gluts and ITB in side lying x 9 min    Ther ex NuStep x 5 min, legs only level 4  Seated figure 4 stretch 3 x 30\"   Side lying clams 2 x 10 L with pillow  Seated clams BTB 3 x 10  NuStep x 5 min, legs only level 4  Seated figure 4 stretch 3 x 30\"   Side lying clams 2 x 10 L with pillow  Seated clams BTB 3 x 10    KINESIOTAPE: lower abdomen I strip NuStep x 5 min, legs only level 4  Seated figure 4 stretch 3 x 30\"  R/L   Side lying clams 2 x 10 L with pillow  Seated clams GTB 2 x 10       NMR TA isometric x 15, 3\"  Isometric adduction in sitting x 15, 5\"  Seated LLE nerve glides x 15  Pallof punches x 10 R/L RTT TA isometric x 15, 3\"  Isometric adduction in sitting x 15, 5\"   seated pelvic tilts  x 15  Seated LLE nerve glides x 15  Pallof punches x 10 R/L RTT ND TA isometric x 15, 3\"  Isometric adduction in sitting x 15, 3\"   seated pelvic tilts  x 15  Seated LLE nerve glides x 15  Pallof punches x 10 R/L RTT ND   Ther act      HEP: pelvic tilts    Charges: 1 man (12 min), 1 ther ex (14 min), 1 NMR (12 min)       Total Timed Treatment: 38 min  Total Treatment Time: 39 min    Certification From: 26/5/3777  To:1/4/2024

## 2023-10-20 ENCOUNTER — ROUTINE PRENATAL (OUTPATIENT)
Dept: OBGYN CLINIC | Facility: CLINIC | Age: 33
End: 2023-10-20
Payer: COMMERCIAL

## 2023-10-20 VITALS
DIASTOLIC BLOOD PRESSURE: 64 MMHG | SYSTOLIC BLOOD PRESSURE: 100 MMHG | BODY MASS INDEX: 25 KG/M2 | WEIGHT: 144 LBS | HEART RATE: 90 BPM

## 2023-10-20 DIAGNOSIS — Z34.02 ENCOUNTER FOR SUPERVISION OF NORMAL FIRST PREGNANCY IN SECOND TRIMESTER: Primary | ICD-10-CM

## 2023-10-20 PROBLEM — O99.019 ANEMIA IN PREG-UNSPEC: Status: ACTIVE | Noted: 2023-10-20

## 2023-10-20 PROCEDURE — 3074F SYST BP LT 130 MM HG: CPT | Performed by: OBSTETRICS & GYNECOLOGY

## 2023-10-20 PROCEDURE — 81002 URINALYSIS NONAUTO W/O SCOPE: CPT | Performed by: OBSTETRICS & GYNECOLOGY

## 2023-10-20 PROCEDURE — 3078F DIAST BP <80 MM HG: CPT | Performed by: OBSTETRICS & GYNECOLOGY

## 2023-10-23 ENCOUNTER — TELEPHONE (OUTPATIENT)
Dept: PHYSICAL THERAPY | Facility: HOSPITAL | Age: 33
End: 2023-10-23

## 2023-10-24 ENCOUNTER — APPOINTMENT (OUTPATIENT)
Dept: PHYSICAL THERAPY | Age: 33
End: 2023-10-24
Attending: OBSTETRICS & GYNECOLOGY
Payer: COMMERCIAL

## 2023-10-24 ENCOUNTER — LAB ENCOUNTER (OUTPATIENT)
Dept: LAB | Facility: HOSPITAL | Age: 33
End: 2023-10-24
Attending: OBSTETRICS & GYNECOLOGY

## 2023-10-24 DIAGNOSIS — Z34.02 ENCOUNTER FOR SUPERVISION OF NORMAL FIRST PREGNANCY IN SECOND TRIMESTER: ICD-10-CM

## 2023-10-24 DIAGNOSIS — Z34.01 ENCOUNTER FOR SUPERVISION OF NORMAL FIRST PREGNANCY IN FIRST TRIMESTER: ICD-10-CM

## 2023-10-24 LAB
GLUCOSE 1H P GLC SERPL-MCNC: 103 MG/DL
GLUCOSE 2H P GLC SERPL-MCNC: 115 MG/DL
GLUCOSE 3H P GLC SERPL-MCNC: 121 MG/DL (ref 70–140)
GLUCOSE P FAST SERPL-MCNC: 63 MG/DL

## 2023-10-24 PROCEDURE — 36415 COLL VENOUS BLD VENIPUNCTURE: CPT

## 2023-10-24 PROCEDURE — 82952 GTT-ADDED SAMPLES: CPT

## 2023-10-24 PROCEDURE — 82951 GLUCOSE TOLERANCE TEST (GTT): CPT

## 2023-10-26 ENCOUNTER — OFFICE VISIT (OUTPATIENT)
Dept: PHYSICAL THERAPY | Age: 33
End: 2023-10-26
Attending: OBSTETRICS & GYNECOLOGY
Payer: COMMERCIAL

## 2023-10-26 PROCEDURE — 97140 MANUAL THERAPY 1/> REGIONS: CPT

## 2023-10-26 PROCEDURE — 97112 NEUROMUSCULAR REEDUCATION: CPT

## 2023-10-26 PROCEDURE — 97110 THERAPEUTIC EXERCISES: CPT

## 2023-10-26 NOTE — PROGRESS NOTES
Diagnosis:   Back Pain in Pregnancy       Referring Provider: [facility]  Date of Evaluation:    10/6/23    Precautions:   2nd trimester of pregnancy due 1/20/24 Next MD visit:   none scheduled  Date of Surgery: n/a   Insurance Primary/Secondary: [place] / N/A     # Auth Visits: 10 auth until 1/4/23            Subjective: Patient reports that she had some PTO this week so she has been off her feet more and she has not had as much pain and she can do her exercises more often. She hasn't slept as well the last few nights so she is in a little bit more pain. She does have more pain when she is working more hours. Pain: 4-510      Objective: See treatment log      Assessment: Patient had no increased pain with the ex this session; progressed with resisted extension this visit to help promote additional force closure of the SIJ to help decrease pain in the back and extending into the LE. Need to reinforce force closure and lumbopelvic stability. Goals:   Goals: (to be met in 10 visits)   1. Patient will be independent in a progressive HEP to help manage symptoms and achieve functional independence in 3 sessions. 2. Patient will decrease his max pain to 5/10 as needed to improve his functional independence in 3 weeks. 3. Patient will be independent with sleep postioning modification to allow her to lay in left side lying and roll in bed. 4. Patient will increase her glut med strength to 4-/5 as needed to improve her lumbopelvic support to stand more than 10 minutes to perform daily chores in her home  5. Patient will increase her low ab strenght to 2/5 as needed to allow her to sit without increased pain to drive to and from work and appointments  6. Patient will increase her hip extensor strength 4-/5 to decrease her pain with sit to stand     Plan: Continue with additional stability exercises; progress SB squats and reinforce force closure as tolerate for progression to DC.      Date: 10/16/23 TX#: 4/10 Date: 10/19/2023                TX#: 5/10  Date: 10/26/2023                TX#: 6/10    Man STM to the left gluts, hip rotators and lumbar paraspinals and IASTM left gluts and ITB in side lying x 9 min  STM to the left gluts, hip rotators and lumbar paraspinals and IASTM left gluts and ITB in side lying x 9 min  STM to the left gluts, hip rotators and lumbar paraspinals and IASTM left gluts and ITB in side lying x 9 min    Ther ex NuStep x 5 min, legs only level 4  Seated figure 4 stretch 3 x 30\"   Side lying clams 2 x 10 L with pillow  Seated clams BTB 3 x 10    KINESIOTAPE: lower abdomen I strip NuStep x 5 min, legs only level 4  Seated figure 4 stretch 3 x 30\"  R/L   Side lying clams 2 x 10 L with pillow  Seated clams GTB 2 x 10     NuStep x 5 min, legs only level 4  Seated figure 4 stretch 3 x 30\"  R/L   Side lying clams 2 x 10 L with pillow  Seated clams GTB 2 x 10    NMR TA isometric x 15, 3\"  Isometric adduction in sitting x 15, 5\"   seated pelvic tilts  x 15  Seated LLE nerve glides x 15  Pallof punches x 10 R/L RTT ND TA isometric x 15, 3\"  Isometric adduction in sitting x 15, 3\"   seated pelvic tilts  x 15  Seated LLE nerve glides x 15  Pallof punches x 10 R/L RTT ND TA isometric x 15, 3\"  Isometric adduction in sitting x 15, 3\"   seated pelvic tilts  x 15  Seated LLE nerve glides x 15  Pallof punches x 10 R/L RTT ND   Ther act      HEP: reviewed    Charges: 1 man (12 min), 1 ther ex (16 min), 1 NMR (12 min)       Total Timed Treatment: 40 min  Total Treatment Time: 40 min    Certification From: 78/9/1899  To:1/4/2024

## 2023-10-27 ENCOUNTER — APPOINTMENT (OUTPATIENT)
Dept: PHYSICAL THERAPY | Age: 33
End: 2023-10-27
Attending: OBSTETRICS & GYNECOLOGY
Payer: COMMERCIAL

## 2023-11-01 ENCOUNTER — OFFICE VISIT (OUTPATIENT)
Dept: PHYSICAL THERAPY | Age: 33
End: 2023-11-01
Attending: OBSTETRICS & GYNECOLOGY
Payer: COMMERCIAL

## 2023-11-01 ENCOUNTER — ROUTINE PRENATAL (OUTPATIENT)
Dept: OBGYN CLINIC | Facility: CLINIC | Age: 33
End: 2023-11-01

## 2023-11-01 VITALS
WEIGHT: 146.38 LBS | HEART RATE: 80 BPM | BODY MASS INDEX: 25 KG/M2 | DIASTOLIC BLOOD PRESSURE: 73 MMHG | SYSTOLIC BLOOD PRESSURE: 111 MMHG

## 2023-11-01 DIAGNOSIS — Z34.93 ENCOUNTER FOR SUPERVISION OF NORMAL PREGNANCY IN THIRD TRIMESTER, UNSPECIFIED GRAVIDITY: Primary | ICD-10-CM

## 2023-11-01 LAB
APPEARANCE: CLEAR
BILIRUBIN: NEGATIVE
GLUCOSE (URINE DIPSTICK): NEGATIVE MG/DL
KETONES (URINE DIPSTICK): NEGATIVE MG/DL
LEUKOCYTES: NEGATIVE
MULTISTIX LOT#: NORMAL NUMERIC
NITRITE, URINE: NEGATIVE
OCCULT BLOOD: NEGATIVE
PH, URINE: 7 (ref 4.5–8)
PROTEIN (URINE DIPSTICK): NEGATIVE MG/DL
SPECIFIC GRAVITY: 1 (ref 1–1.03)
URINE-COLOR: YELLOW
UROBILINOGEN,SEMI-QN: 0.2 MG/DL (ref 0–1.9)

## 2023-11-01 PROCEDURE — 3074F SYST BP LT 130 MM HG: CPT | Performed by: OBSTETRICS & GYNECOLOGY

## 2023-11-01 PROCEDURE — 97140 MANUAL THERAPY 1/> REGIONS: CPT

## 2023-11-01 PROCEDURE — 81002 URINALYSIS NONAUTO W/O SCOPE: CPT | Performed by: OBSTETRICS & GYNECOLOGY

## 2023-11-01 PROCEDURE — 3078F DIAST BP <80 MM HG: CPT | Performed by: OBSTETRICS & GYNECOLOGY

## 2023-11-01 PROCEDURE — 97112 NEUROMUSCULAR REEDUCATION: CPT

## 2023-11-01 PROCEDURE — 97110 THERAPEUTIC EXERCISES: CPT

## 2023-11-01 RX ORDER — FERROUS SULFATE 137(45) MG
45 TABLET, EXTENDED RELEASE ORAL DAILY
COMMUNITY

## 2023-11-01 NOTE — PROGRESS NOTES
Discharge Summary  Pt has attended 7 visits in Physical Therapy. Diagnosis:   Back Pain in Pregnancy       Referring Provider: [facility]  Date of Evaluation:    10/6/23    Precautions:   3rd trimester of pregnancy due 1/20/24 Next MD visit:   none scheduled  Date of Surgery: n/a   Insurance Primary/Secondary: [employer] / N/A     # Auth Visits: 10 auth until 1/4/23            Subjective: Patient reports that she is noticing there is a little bit more SOB now that she is in her third trimester. She is noticing some improvement in her SIJ pain; they pain is still there but she eels some improvement. She is doing her exercises as much as she can because the stretches seem to help. She does get some increased pain with prolonged sitting more than one hour so on longer car rides she takes some breaks. She states that there is always some constant pain in the hip/SIJ. She is having less of the radicular symptoms since starting physical therapy. She can stand a little bit longer and she uses some stretches to improve sit to stand and also car transfers. She is happy that the leg pain and the intensity of her pain is less than it was.    Current functional limitations include sit to stand, standing more than 15-20, sitting more than 10 min, car transfers, driving in the car more than 60 min, laying on the left, rolling in bed  Pain: 2-310      Objective: See treatment log  Observation/Posture: Patient stands with left low iliac crest, anterior pelvic tilt, bilateral femoral medial rotation and min lumbar extension  Neuro Screen: no deficits to light touch    Lumbar AROM: (* denotes performed with pain)  Flexion: 25% decrease in range with left low back pain*  Extension: 25% decrease in range and pain free  Sidebending: R WFL and pain free with L4 pivot point ; L WFL and pain free with L4 pivot point  Rotation: R WFL and pain free ; L  WFL and pain free     Accessory motion: all hip AROM WFL  Palpation: Tender at the right gluts, piriformis and right SIJ    Strength: (* denotes performed with pain)  LE   Hip flexion (L2): R 4/5; L 4/5  Hip abduction: R 4+/5; L 4+/5  Hip Extension: R 4-/5; L 3+/5   Hip ER: R 4/5; L 4/5  Hip IR: R 4/5; L 4/5  Knee Flexion: R 4+/5; L 4+/5   Knee extension (L3): R 4+/5; L 4+/5   DF (L4): R 5/5; L 5/5  PF (S1): R 5/5; L 5/5  Glut med: R 4-/5, L 3+/5      Lower abdominals: 1+/5       Flexibility:   LE   Hip Flexor: R min stiff, L min stiff  Hamstrings: R -25; L -25   Piriformis: R WFL; L mod stiff       Special tests:   Negative Gillet    Gait: pt ambulates on level ground with increased lumbar rotation during the gait cycle and left hip drop. Balance: SLS R 30, L 30 with hip drop    Assessment: Patient has made gains in her core stability and force closure musculature as needed to support the SIJ and lumbar spine during ADLS as she is experience postural changes associated with pregnancy. She continues to have minimal pain complaints; however, she is independent in a progressive HEP and is appropriate to manage her symptoms independently at this time. Goals:   Goals: (to be met in 10 visits)   1. Patient will be independent in a progressive HEP to help manage symptoms and achieve functional independence in 3 sessions. MET  2. Patient will decrease his max pain to 5/10 as needed to improve his functional independence in 3 weeks. MET  3. Patient will be independent with sleep postioning modification to allow her to lay in left side lying and roll in bed. PARTIALLY MET  4. Patient will increase her glut med strength to 4-/5 as needed to improve her lumbopelvic support to stand more than 10 minutes to perform daily chores in her home PARTIALLY MET  5. Patient will increase her low ab strenght to 2/5 as needed to allow her to sit without increased pain to drive to and from work and appointments [employer]   6.  Patient will increase her hip extensor strength 4-/5 to decrease her pain with sit to stand PARTIALLY MET      Post Oswestry Disability Index Score  Post Score: 18 % (11/1/2023 12:05 PM)    -2 % improvement    Plan: DC with HEP. Patient was instructed to follow up with their physician should an exacerbation of symptoms arise. Patient/Family/Caregiver was advised of these findings, precautions, and treatment options and has agreed to actively participate in planning and for this course of care. Thank you for your referral. If you have any questions, please contact me at Dept: [phone]. Sincerely,  Electronically signed by therapist: Aurelio Reyes PT     Physician's certification required:  No  Please co-sign or sign and return this letter via fax as soon as possible to [phone]. I certify the need for these services furnished under this plan of treatment and while under my care.     X___________________________________________________ Date____________________    Certification From: 19/0/6896  To:1/30/2024        Date: 10/19/2023                TX#: 5/10  Date: 10/26/2023                TX#: 6/10  Date: 11/1/2023                TX#: 7/10    Man STM to the left gluts, hip rotators and lumbar paraspinals and IASTM left gluts and ITB in side lying x 9 min  STM to the left gluts, hip rotators and lumbar paraspinals and IASTM left gluts and ITB in side lying x 9 min  STM to the left gluts, hip rotators and lumbar paraspinals and IASTM left gluts and ITB in side lying x 9 min    Ther ex NuStep x 5 min, legs only level 4  Seated figure 4 stretch 3 x 30\"  R/L   Side lying clams 2 x 10 L with pillow  Seated clams GTB 2 x 10     NuStep x 5 min, legs only level 4  Seated figure 4 stretch 3 x 30\"  R/L   Side lying clams 2 x 10 L with pillow  Seated clams GTB 2 x 10  NuStep x 5 min, legs only level 4  Seated figure 4 stretch 3 x 30\"  R/L   Side lying clams 2 x 10 L with pillow  Seated clams GTB 2 x 10   Resisted shoulder ext RTB   NMR TA isometric x 15, 3\"  Isometric adduction in sitting x 15, 3\"   seated pelvic tilts  x 15  Seated LLE nerve glides x 15  Pallof punches x 10 R/L RTT ND TA isometric x 15, 3\"  Isometric adduction in sitting x 15, 3\"   seated pelvic tilts  x 15  Seated LLE nerve glides x 15  Pallof punches x 10 R/L RTT ND TA isometric x 15, 3\"  Isometric adduction in sitting x 15, 3\"   seated pelvic tilts  x 15  Seated LLE nerve glides x 15  Pallof punches x 10 R/L RTT ND   Ther act      HEP: reviewed FINAL HEP; added resisted shoulder ext    Charges: 1 man (12 min), 1 ther ex (17 min), 1 NMR (11 min)       Total Timed Treatment: 40 min  Total Treatment Time: 40 min    Certification From: 65/3/7132  To:1/4/2024

## 2023-11-15 ENCOUNTER — LAB ENCOUNTER (OUTPATIENT)
Dept: LAB | Facility: HOSPITAL | Age: 33
End: 2023-11-15
Attending: OBSTETRICS & GYNECOLOGY
Payer: COMMERCIAL

## 2023-11-15 ENCOUNTER — ROUTINE PRENATAL (OUTPATIENT)
Dept: OBGYN CLINIC | Facility: CLINIC | Age: 33
End: 2023-11-15

## 2023-11-15 VITALS
BODY MASS INDEX: 26 KG/M2 | HEART RATE: 84 BPM | DIASTOLIC BLOOD PRESSURE: 61 MMHG | WEIGHT: 151 LBS | SYSTOLIC BLOOD PRESSURE: 96 MMHG

## 2023-11-15 DIAGNOSIS — Z34.93 ENCOUNTER FOR SUPERVISION OF NORMAL PREGNANCY IN THIRD TRIMESTER, UNSPECIFIED GRAVIDITY: Primary | ICD-10-CM

## 2023-11-15 DIAGNOSIS — Z34.02 ENCOUNTER FOR SUPERVISION OF NORMAL FIRST PREGNANCY IN SECOND TRIMESTER: ICD-10-CM

## 2023-11-15 LAB
APPEARANCE: CLEAR
BASOPHILS # BLD AUTO: 0.02 X10(3) UL (ref 0–0.2)
BASOPHILS NFR BLD AUTO: 0.2 %
BILIRUBIN: NEGATIVE
DEPRECATED HBV CORE AB SER IA-ACNC: 5.3 NG/ML
DEPRECATED RDW RBC AUTO: 43 FL (ref 35.1–46.3)
EOSINOPHIL # BLD AUTO: 0.11 X10(3) UL (ref 0–0.7)
EOSINOPHIL NFR BLD AUTO: 1.3 %
ERYTHROCYTE [DISTWIDTH] IN BLOOD BY AUTOMATED COUNT: 13.1 % (ref 11–15)
GLUCOSE (URINE DIPSTICK): NEGATIVE MG/DL
HCT VFR BLD AUTO: 33.4 %
HGB BLD-MCNC: 10.9 G/DL
IMM GRANULOCYTES # BLD AUTO: 0.12 X10(3) UL (ref 0–1)
IMM GRANULOCYTES NFR BLD: 1.4 %
KETONES (URINE DIPSTICK): NEGATIVE MG/DL
LYMPHOCYTES # BLD AUTO: 1.86 X10(3) UL (ref 1–4)
LYMPHOCYTES NFR BLD AUTO: 21.6 %
MCH RBC QN AUTO: 29.1 PG (ref 26–34)
MCHC RBC AUTO-ENTMCNC: 32.6 G/DL (ref 31–37)
MCV RBC AUTO: 89.1 FL
MONOCYTES # BLD AUTO: 0.46 X10(3) UL (ref 0.1–1)
MONOCYTES NFR BLD AUTO: 5.3 %
MULTISTIX LOT#: ABNORMAL NUMERIC
NEUTROPHILS # BLD AUTO: 6.06 X10 (3) UL (ref 1.5–7.7)
NEUTROPHILS # BLD AUTO: 6.06 X10(3) UL (ref 1.5–7.7)
NEUTROPHILS NFR BLD AUTO: 70.2 %
NITRITE, URINE: NEGATIVE
OCCULT BLOOD: NEGATIVE
PH, URINE: 7 (ref 4.5–8)
PLATELET # BLD AUTO: 188 10(3)UL (ref 150–450)
PROTEIN (URINE DIPSTICK): NEGATIVE MG/DL
RBC # BLD AUTO: 3.75 X10(6)UL
SPECIFIC GRAVITY: 1.01 (ref 1–1.03)
URINE-COLOR: YELLOW
UROBILINOGEN,SEMI-QN: 0.2 MG/DL (ref 0–1.9)
WBC # BLD AUTO: 8.6 X10(3) UL (ref 4–11)

## 2023-11-15 PROCEDURE — 36415 COLL VENOUS BLD VENIPUNCTURE: CPT

## 2023-11-15 PROCEDURE — 82728 ASSAY OF FERRITIN: CPT

## 2023-11-15 PROCEDURE — 81002 URINALYSIS NONAUTO W/O SCOPE: CPT | Performed by: OBSTETRICS & GYNECOLOGY

## 2023-11-15 PROCEDURE — 3078F DIAST BP <80 MM HG: CPT | Performed by: OBSTETRICS & GYNECOLOGY

## 2023-11-15 PROCEDURE — 85025 COMPLETE CBC W/AUTO DIFF WBC: CPT

## 2023-11-15 PROCEDURE — 3074F SYST BP LT 130 MM HG: CPT | Performed by: OBSTETRICS & GYNECOLOGY

## 2023-11-15 NOTE — PROGRESS NOTES
Here with partner. Will get flu shot at work. Declined Tdap. Taking PNV and iron. Will do repeat labs.   RTC 2 wk

## 2023-11-16 DIAGNOSIS — O99.019 ANEMIA IN PREG-UNSPEC: Primary | ICD-10-CM

## 2023-11-28 ENCOUNTER — TELEPHONE (OUTPATIENT)
Dept: OBGYN CLINIC | Facility: CLINIC | Age: 33
End: 2023-11-28

## 2023-11-28 NOTE — TELEPHONE ENCOUNTER
Visit notes dated 11/27/2023 received from [address] [place] [address] and placed on [facility]'s desk for review.

## 2023-11-30 ENCOUNTER — ROUTINE PRENATAL (OUTPATIENT)
Dept: OBGYN CLINIC | Facility: CLINIC | Age: 33
End: 2023-11-30
Payer: COMMERCIAL

## 2023-11-30 VITALS
WEIGHT: 150.63 LBS | BODY MASS INDEX: 26 KG/M2 | HEART RATE: 84 BPM | SYSTOLIC BLOOD PRESSURE: 107 MMHG | DIASTOLIC BLOOD PRESSURE: 72 MMHG

## 2023-11-30 DIAGNOSIS — Z34.93 ENCOUNTER FOR SUPERVISION OF NORMAL PREGNANCY IN THIRD TRIMESTER, UNSPECIFIED GRAVIDITY: Primary | ICD-10-CM

## 2023-11-30 LAB
BILIRUBIN: NEGATIVE
GLUCOSE (URINE DIPSTICK): NEGATIVE MG/DL
KETONES (URINE DIPSTICK): NEGATIVE MG/DL
MULTISTIX LOT#: ABNORMAL NUMERIC
NITRITE, URINE: NEGATIVE
OCCULT BLOOD: NEGATIVE
PH, URINE: 7.5 (ref 4.5–8)
PROTEIN (URINE DIPSTICK): NEGATIVE MG/DL
SPECIFIC GRAVITY: 1.01 (ref 1–1.03)
UROBILINOGEN,SEMI-QN: 0.2 MG/DL (ref 0–1.9)

## 2023-11-30 PROCEDURE — 3074F SYST BP LT 130 MM HG: CPT | Performed by: OBSTETRICS & GYNECOLOGY

## 2023-11-30 PROCEDURE — 81002 URINALYSIS NONAUTO W/O SCOPE: CPT | Performed by: OBSTETRICS & GYNECOLOGY

## 2023-11-30 PROCEDURE — 3078F DIAST BP <80 MM HG: CPT | Performed by: OBSTETRICS & GYNECOLOGY

## 2023-12-08 ENCOUNTER — TELEPHONE (OUTPATIENT)
Dept: OBGYN CLINIC | Facility: CLINIC | Age: 33
End: 2023-12-08

## 2023-12-08 NOTE — TELEPHONE ENCOUNTER
Breast pump order received via fax from [facility]. Order from completed and faxed back to [facility]. Breast pump order sent to scanning.

## 2023-12-14 ENCOUNTER — ROUTINE PRENATAL (OUTPATIENT)
Dept: OBGYN CLINIC | Facility: CLINIC | Age: 33
End: 2023-12-14
Payer: COMMERCIAL

## 2023-12-14 VITALS
BODY MASS INDEX: 26 KG/M2 | HEART RATE: 80 BPM | DIASTOLIC BLOOD PRESSURE: 70 MMHG | WEIGHT: 153 LBS | SYSTOLIC BLOOD PRESSURE: 109 MMHG

## 2023-12-14 DIAGNOSIS — Z34.93 ENCOUNTER FOR SUPERVISION OF NORMAL PREGNANCY IN THIRD TRIMESTER, UNSPECIFIED GRAVIDITY: Primary | ICD-10-CM

## 2023-12-14 LAB
APPEARANCE: CLEAR
BILIRUBIN: NEGATIVE
GLUCOSE (URINE DIPSTICK): NEGATIVE MG/DL
KETONES (URINE DIPSTICK): NEGATIVE MG/DL
LEUKOCYTES: NEGATIVE
MULTISTIX LOT#: NORMAL NUMERIC
NITRITE, URINE: NEGATIVE
OCCULT BLOOD: NEGATIVE
PH, URINE: 7 (ref 4.5–8)
PROTEIN (URINE DIPSTICK): NEGATIVE MG/DL
SPECIFIC GRAVITY: 1.01 (ref 1–1.03)
URINE-COLOR: YELLOW
UROBILINOGEN,SEMI-QN: 0.2 MG/DL (ref 0–1.9)

## 2023-12-14 PROCEDURE — 3078F DIAST BP <80 MM HG: CPT | Performed by: OBSTETRICS & GYNECOLOGY

## 2023-12-14 PROCEDURE — 81002 URINALYSIS NONAUTO W/O SCOPE: CPT | Performed by: OBSTETRICS & GYNECOLOGY

## 2023-12-14 PROCEDURE — 3074F SYST BP LT 130 MM HG: CPT | Performed by: OBSTETRICS & GYNECOLOGY

## 2023-12-22 ENCOUNTER — ROUTINE PRENATAL (OUTPATIENT)
Dept: OBGYN CLINIC | Facility: CLINIC | Age: 33
End: 2023-12-22

## 2023-12-22 VITALS
SYSTOLIC BLOOD PRESSURE: 108 MMHG | DIASTOLIC BLOOD PRESSURE: 66 MMHG | HEART RATE: 81 BPM | WEIGHT: 154 LBS | BODY MASS INDEX: 26 KG/M2

## 2023-12-22 DIAGNOSIS — Z87.440 HISTORY OF RECURRENT UTIS: ICD-10-CM

## 2023-12-22 DIAGNOSIS — Z34.93 ENCOUNTER FOR SUPERVISION OF NORMAL PREGNANCY IN THIRD TRIMESTER, UNSPECIFIED GRAVIDITY: Primary | ICD-10-CM

## 2023-12-22 LAB
APPEARANCE: CLEAR
BILIRUBIN: NEGATIVE
GLUCOSE (URINE DIPSTICK): NEGATIVE MG/DL
KETONES (URINE DIPSTICK): NEGATIVE MG/DL
MULTISTIX LOT#: ABNORMAL NUMERIC
NITRITE, URINE: NEGATIVE
OCCULT BLOOD: NEGATIVE
PH, URINE: 7.5 (ref 4.5–8)
SPECIFIC GRAVITY: 1.01 (ref 1–1.03)
URINE-COLOR: YELLOW
UROBILINOGEN,SEMI-QN: 0.2 MG/DL (ref 0–1.9)

## 2023-12-22 PROCEDURE — 3078F DIAST BP <80 MM HG: CPT | Performed by: OBSTETRICS & GYNECOLOGY

## 2023-12-22 PROCEDURE — 81002 URINALYSIS NONAUTO W/O SCOPE: CPT | Performed by: OBSTETRICS & GYNECOLOGY

## 2023-12-22 PROCEDURE — 3074F SYST BP LT 130 MM HG: CPT | Performed by: OBSTETRICS & GYNECOLOGY

## 2023-12-22 PROCEDURE — G0101 CA SCREEN;PELVIC/BREAST EXAM: HCPCS | Performed by: OBSTETRICS & GYNECOLOGY

## 2023-12-29 ENCOUNTER — LAB ENCOUNTER (OUTPATIENT)
Dept: LAB | Facility: HOSPITAL | Age: 33
End: 2023-12-29
Attending: OBSTETRICS & GYNECOLOGY

## 2023-12-29 ENCOUNTER — ROUTINE PRENATAL (OUTPATIENT)
Dept: OBGYN CLINIC | Facility: CLINIC | Age: 33
End: 2023-12-29
Payer: COMMERCIAL

## 2023-12-29 VITALS
HEART RATE: 85 BPM | WEIGHT: 156 LBS | BODY MASS INDEX: 27 KG/M2 | DIASTOLIC BLOOD PRESSURE: 69 MMHG | SYSTOLIC BLOOD PRESSURE: 105 MMHG

## 2023-12-29 DIAGNOSIS — Z34.93 ENCOUNTER FOR SUPERVISION OF NORMAL PREGNANCY IN THIRD TRIMESTER, UNSPECIFIED GRAVIDITY: Primary | ICD-10-CM

## 2023-12-29 LAB
BILIRUBIN: NEGATIVE
GLUCOSE (URINE DIPSTICK): NEGATIVE MG/DL
KETONES (URINE DIPSTICK): NEGATIVE MG/DL
LEUKOCYTES: NEGATIVE
MULTISTIX LOT#: 57 NUMERIC
NITRITE, URINE: NEGATIVE
OCCULT BLOOD: NEGATIVE
PH, URINE: 7 (ref 4.5–8)
PROTEIN (URINE DIPSTICK): NEGATIVE MG/DL
SPECIFIC GRAVITY: 1 (ref 1–1.03)
UROBILINOGEN,SEMI-QN: 0.2 MG/DL (ref 0–1.9)

## 2023-12-29 PROCEDURE — 3074F SYST BP LT 130 MM HG: CPT | Performed by: OBSTETRICS & GYNECOLOGY

## 2023-12-29 PROCEDURE — 81002 URINALYSIS NONAUTO W/O SCOPE: CPT | Performed by: OBSTETRICS & GYNECOLOGY

## 2023-12-29 PROCEDURE — 3078F DIAST BP <80 MM HG: CPT | Performed by: OBSTETRICS & GYNECOLOGY

## 2023-12-30 LAB — GROUP B STREP BY PCR FOR PCR OVT: NEGATIVE

## 2024-01-11 NOTE — PROGRESS NOTES
Her grandma is currently in ICU at [facility] and she has been spending a lot of time at her bedside. RTC 1 wk

## 2024-01-16 NOTE — PROGRESS NOTES
Pt is a 33 year old female admitted to [facility].     Chief Complaint   Patient presents with    R/o Rom    R/o Labor      Pt is  39w3d intra-uterine pregnancy.  History obtained, consents signed. Oriented to room, staff, and plan of care.

## 2024-01-16 NOTE — TELEPHONE ENCOUNTER
39w3d. Pt calling c/o lower back and lower abdominal ctxs that come and go. Pt states this has been going on all day and has been constant for the last hour. Pt states she lost her mucus plug this morning. Pt also states she has had to three bouts of diarrhea since losing her plug. Pt is able to hydrate w/o issue. Is also able to eat a meal with snacks today. Pt denies any s/s of illness. She also indicates today she has noted an increase in wetness to her underwear and pad. Pt is unsure if she is having LOF. Pt does indicate +FM. Denies any bleeding.    Pt also states she feels like the pain is taking her breath away, making it hard for her to walk and making her dizzy.     Pt given recs to head to the [facility] triage for evaluation.     Triage called and informed pt is coming.     YOLIS on-call notified.

## 2024-01-16 NOTE — TELEPHONE ENCOUNTER
Pr is 39w3d and feels like she might be in labor states she lost her mucus plug this morning and is feeling a lot of pressure on her lower abdomen. Please advise

## 2024-01-17 NOTE — TELEPHONE ENCOUNTER
Patient called in request a nurse to call, patient need a note to be excused from work for Friday and Saturday.

## 2024-01-17 NOTE — TELEPHONE ENCOUNTER
39w4d. Pt was evaluated in FB yesterday to r/u labor (please see 1/16 TE and [facility] triage notes). Pt has been experiencing severe lower back and abdominal pain for the past couple of days. Pt has tried recs including adequate hydration with little to no relief. Pt works as a nurse in the cardiac care unit and states she is scheduled for 2 more shifts this Friday and Saturday, which is all the way up until her due date. 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 Pt states she did not feel she was able to give her best performance at work. Pt states that her grandma is also currently in hospice care. Pt tearful on the phone. Pt desires a note to be excused from work this Friday and Saturday, as she is concerned she will not be able to give her best performance at work due to symptoms. Pt states her work desires this note ASAP as they will need to find coverage for this weekend. Pt also has a pn appt with JOSE ELIAS scheduled for tomorrow, and plans to discuss starting her FMLA leave early with JOSE ELIAS as she has been so uncomfortable these past several days.    To ZACHARY, on-call to please advise if OK for note to excuse pt from work this Friday, 1/19 and this Saturday, 1/20. Thank you!

## 2024-01-17 NOTE — TRIAGE
[facility]      Triage Note    Yanely Amador Patient Status:  Outpatient    1990 MRN [id number]   Location [facility] Attending Yury Ramos,    Hosp Day # 0 PCP Shoshana Webb MD      Para:   Estimated Date of Delivery: 24  Gestation: 39w3d    Chief Complaint    R/o Rom; R/o Labor; Medical Complication         Allergies:    Allergies   Allergen Reactions    Loratadine OTHER (SEE COMMENTS)     Gas bubbles in intestines    gas   Gas bubbles in intestines   Gas bubbles in intestines    Nitrofurantoin ASTHMA, OTHER (SEE COMMENTS) and UNKNOWN     syncope    syncope   syncope    Penicillins DIZZINESS and SHORTNESS OF BREATH      Dizziness,H/A.SOB and throat that was numb and tingling    Pentasodium Triphosphate [Sodium Phosphate] RASH and FACE FLUSHING    Claritin      Gas bubbles in intestines    Clindamycin RASH and UNKNOWN       Orders Placed This Encounter   Procedures    Urinalysis with Culture Reflex    POCT Ferning       Lab Results   Component Value Date    WBC 9.3 2023    HGB 12.5 2023    HCT 39.4 2023    .0 2023    CREATSERUM 0.66 2021    BUN 6.0 2021     2021    K 3.67 2021     2021    CO2 27.3 2021    GLU 91 2021    CA 9.1 2021    ALB 4.5 2021    ALKPHO 53 2021    BILT 0.37 2021    TP 7.4 2021    AST 15 2021    ALT 11 2021    TSH 1.820 2020    B12 360 2020       Clinitek UA  Lab Results   Component Value Date    GLUUR Normal 2024    SPECGRAVITY 1.008 2024    URINECUL No Growth at 18-24 hrs. 2023       UA  Lab Results   Component Value Date    COLORUR Colorless (A) 2024    CLARITY Clear 2024    SPECGRAVITY 1.008 2024    PROUR Negative 2024    GLUUR Normal 2024    KETUR Negative 2024    BILUR Negative 2024    BLOODURINE Negative 2024     NITRITE Negative 01/16/2024    UROBILINOGEN Normal 01/16/2024    LEUUR Negative 01/16/2024       Vitals:    01/16/24 1945 01/16/24 2000 01/16/24 2011 01/16/24 2015   BP:       Pulse: 81 81 95 95   Resp: 16      Temp:       TempSrc:       SpO2: 95% 97% 97% 97%   Weight:           NST  Variability: Moderate           Accelerations: Yes           Decelerations: None            Baseline: 135 BPM           Uterine Irritability: Yes           Contractions: Irregular                                        Acoustic Stimulator: No           Nonstress Test Interpretation: Reactive           Nonstress Test Second Interpretation: Reactive          FHR Category: Category I             Additional Comments       Chief Complaint   Patient presents with    R/o Rom     Has felt some leaking since 12:00 today    R/o Labor     Pt c/o constant back pain. Pt states \"it feels like my back is on fire). Pt also c/o lower abdominal pressure    Medical Complication     Pt has had dizziness all day today while she has been at work.     UA sent, ferning resulted and not present, labs reviewed by Dr. Ramos. SVE 1/80/-2, +EFM.  Pt may be discharged home per Dr. Ramos with labor precautions and instructions to take tylenol for pain. Pt verbalized understanding and was discharged home in stable condition.    Uriel DUNCAN RN  1/16/2024 9:36 PM    Physician Evaluation      NST Interpretation: Reactive    Disposition:   Discharged    Comments:    Agree with RN note.  FWBR.  NST reactive.      Yury Ramos,

## 2024-01-24 NOTE — ANESTHESIA PROCEDURE NOTES
Labor Analgesia    Date/Time: 1/24/2024 4:33 PM    Performed by: Gary Bailey MD  Authorized by: Gary Bailey MD      General Information and Staff    Start Time:  1/24/2024 4:33 PM  End Time:  1/24/2024 4:33 PM  Anesthesiologist:  Gary Bailey MD  Performed by:  Anesthesiologist  Patient Location:  OB  Reason for Block: labor epidural    Preanesthetic Checklist: patient identified, IV checked, risks and benefits discussed, monitors and equipment checked, pre-op evaluation, timeout performed, anesthesia consent and sterile technique used      Procedure Details    Patient Position:  Sitting  Prep: ChloraPrep    Monitoring:  Heart rate  Approach:  Midline    Epidural Needle    Injection Technique:  ESTEPHANIA air  Needle Type:  Tuohy  Needle Gauge:  18 G  Needle Length:  3.5 in  Location:  L2-3    Spinal Needle      Catheter    Catheter Type:  Multi-orifice  Catheter Size:  20 G  Test Dose:  Negative    Assessment      Additional Comments

## 2024-01-24 NOTE — TELEPHONE ENCOUNTER
40w4d   and pt both on the phone.  They states pts contractions started about 9:10am this morning and have been 3-5 minutes apart since they started.  Pt is also having an increase in discharge tinged with blood, and also states her pants are soaking wet with fluid.  Pt has been drinking fluids throughout the morning.  Pt is unsure if the fluid is urine or not.  While on the phone pt had a contraction and pt could not speak and was moaning in pain.   states contractions are lasting about 1 minute.   states they can try having her drink more water but he is unsure how much longer she can last based on the pain.  Pt advised to go to [facility] for eval.  [facility] and on call provider notified.

## 2024-01-24 NOTE — H&P
[facility]    History & Physical    Yanely Amador Patient Status:  Inpatient    1990 MRN [id number]   Location [facility] Attending Yury Ramos, DO   Hosp Day # 0 PCP Shoshana Webb MD     Date of Admission:  2024    SUBJECTIVE:    Yanely Amador is a 34 year old  at 40w4d with 2024, by Last Menstrual Period who is being admitted for SROM.  Pt states started leaking around 330am today.  Didn't present to [facility] triage til 1130am.        Lab Results   Component Value Date    ABO BLOOD TYPE O 2024    RH BLOOD TYPE Positive 2024    HGB 13.1 2024    .0 2024    HCT 38.6 2024    Rubella IgG Positive 2023    Treponemal Antibodies Nonreactive 2023    HIV Antigen Antibody Combo Non-Reactive 2023          Problem List:   Patient Active Problem List    Diagnosis    Pregnancy    Anemia in preg-unspec     Recent Labs     10/17/23  1505 11/15/23  0935 23  0918   RBC 3.74* 3.75* 4.37   HGB 10.9* 10.9* 12.5   HCT 32.7* 33.4* 39.4   MCV 87.4 89.1 90.2   MCH 29.1 29.1 28.6   MCHC 33.3 32.6 31.7   RDW 12.9 13.1 14.7   NEPRELIM  --  6.06  --    WBC 12.2* 8.6 9.3   .0 188.0 156.0           If Hb less than 11, start iron & then recheck CBC/ferritin in one month    Decreased MCV, try iron x one month, repeat CBC.  If not improved significantly, then iron studies (ferritin, TIBC), Hb electorphoresis    Normal MCV: Hb electrophoresis    Increased MCV: check for vit B12, folate    If Hb<8, MFM & heme consults and consider transfusion           COVID-19 affecting pregnancy in second trimester     MFM scheduled --2155gm  57%  Covid on 23      Genetic testing     Wants FTS with MFM       Recurrent urinary tract infection affecting pregnancy in first trimester     H/o recurrent UTIs.  Plan for Urine culture each trimester      Acute bilateral low back pain with left-sided sciatica     Message today  with worsening pain over past 2 weeks. Plan PT referral.        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     Family History:   Family History   Problem Relation Age of Onset    No Known Problems Father     High Cholesterol Mother     Cancer Mother         Thyroid    Asthma Mother     Diabetes Maternal Grandmother     Other (COPD) Maternal Grandmother     Thyroid Cancer Paternal Grandmother     Other (lung cancer) Maternal Aunt         also CHF    Breast Cancer Neg     Colon Cancer Neg      Social History:   Social History     Tobacco Use    Smoking status: Never    Smokeless tobacco: Never   Substance Use Topics    Alcohol use: Not Currently     Comment: occasional prior to pregnancy       Home Meds:   Medications Prior to Admission   Medication Sig Dispense Refill Last Dose    Ferrous Sulfate ER (SLOW FE) 142 (45 Fe) MG Oral Tab CR Take 45 mg by mouth daily.   Past Week    prenatal vitamin with DHA 27-0.8-228 MG Oral Cap Take 1 capsule by mouth daily.   2024     Allergies:   Allergies   Allergen Reactions    Loratadine OTHER (SEE COMMENTS)     Gas bubbles in intestines    gas   Gas bubbles in intestines   Gas bubbles in intestines    Nitrofurantoin ASTHMA, OTHER (SEE COMMENTS) and UNKNOWN     syncope    syncope   syncope    Penicillins DIZZINESS and SHORTNESS OF BREATH       Dizziness,H/A.SOB and throat that was numb and tingling    Pentasodium Triphosphate [Sodium Phosphate] RASH and FACE FLUSHING    Claritin      Gas bubbles in intestines    Clindamycin RASH and UNKNOWN       OBJECTIVE:    Temp:  [97.8 °F (36.6 °C)-98.7 °F (37.1 °C)] 97.8 °F (36.6 °C)  Pulse:  [71-88] 88  Resp:  [18] 18  BP: (117-118)/(72-75) 118/72    General: Alert and Oriented  Abdomen: Soft, Gravid  Leopolds: 7.5#  Fht; cat 1  Tocos: q2-3m  Cx: 3-/-2--AROMed forebag with large amt of thin mec with bloody fluid    Lab Review:  Results for orders placed or performed during the hospital encounter of 24   ABORH (Blood Type)   Result Value Ref Range    ABO BLOOD TYPE O     RH BLOOD TYPE Positive    Antibody Screen   Result Value Ref Range    Antibody Screen Negative    CBC W/ DIFFERENTIAL   Result Value Ref Range    WBC 13.7 (H) 4.0 - 11.0 x10(3) uL    RBC 4.53 3.80 - 5.30 x10(6)uL    HGB 13.1 12.0 - 16.0 g/dL    HCT 38.6 35.0 - 48.0 %    MCV 85.2 80.0 - 100.0 fL    MCH 28.9 26.0 - 34.0 pg    MCHC 33.9 31.0 - 37.0 g/dL    RDW-SD 43.5 35.1 - 46.3 fL    RDW 14.1 11.0 - 15.0 %    .0 150.0 - 450.0 10(3)uL    Neutrophil Absolute Prelim 10.55 (H) 1.50 - 7.70 x10 (3) uL    Neutrophil Absolute 10.55 (H) 1.50 - 7.70 x10(3) uL    Lymphocyte Absolute 2.11 1.00 - 4.00 x10(3) uL    Monocyte Absolute 0.75 0.10 - 1.00 x10(3) uL    Eosinophil Absolute 0.07 0.00 - 0.70 x10(3) uL    Basophil Absolute 0.04 0.00 - 0.20 x10(3) uL    Immature Granulocyte Absolute 0.20 0.00 - 1.00 x10(3) uL    Neutrophil % 76.8 %    Lymphocyte % 15.4 %    Monocyte % 5.5 %    Eosinophil % 0.5 %    Basophil % 0.3 %    Immature Granulocyte % 1.5 %        ASSESSMENT:    Patient is a  at 40w4d with SROM      PLAN:    Admit  Cefm/toco  FHTs reassuring   GBS neg  Labor: expectant mgmt s/p AROM for forebag with large fluid return--thin mec  Discussed analgesia  POC d/w pt; all questions answered      Yury Ramos DO  2024  3:18  PM

## 2024-01-24 NOTE — PROGRESS NOTES
Pt is a 34 year old female admitted to TR2/TR2-A.     Chief Complaint   Patient presents with    R/o Labor    R/o Rom     States SROM at 0330 am and Contractions since 9am      Pt is  40w4d intra-uterine pregnancy.  History obtained, consents signed. Oriented to room, staff, and plan of care.

## 2024-01-24 NOTE — DISCHARGE INSTRUCTIONS
[facility] has great support for our families even after discharge.  We have virtual or in-person support groups.  Visit our website for the most up-to-date info for our many different support groups. [email or web address]       Outpatient Lactation appoints.  Call [phone] to schedule an appt.  Our office is located in the Maternal Fetal Medicine office next to [facility] on the first floor.      Moms Support Groups  Our weekly New Mom Support Groups are for any new parents in our community. They are led by an experienced Mother/Baby nurse or IBCLC and usually include a guest speaker on a topic of interest to new parents. These in-person groups also include Breastfeeding Support at each meeting. Bring your baby ( - 6 months) with you! Moms-to-be are also welcome! All mom's welcome even if its not your first.     MOM & BABY HOUR   Meets most  10:00 - 11:30 a.m.  Masks are not required, but be considerate of others and do not attend if mom or baby have had any symptoms of illness within the previous 24 hours. Breastfeeding support will be included at each session--just ask the leader any breastfeeding questions you may have. Location [facility] [place] [address] [place] Go inside the front door and to the right to the “Community Education Room”.    Mom's Line: [phone]   This service is provided by [facility], has a phone line dedicated for women (or anyone worried about a women) who may be experiencing signs or symptoms of postpartum depression.    Nurturing Mom- A support group for new and expectant moms looking for support with the transition to parenthood as well as those experiencing symptoms of  anxiety and/or depression.  Please contact [email or web address] if you need directions or the link for the virtual meetings. Please contact  [email or web address] if you plan to attend, but please be considerate of others and do not attend if mom or baby have had any symptoms of illness within the previous 24 hours.     La Leche League for breast feeding and parent support, Website: IIIus.org  and for the [place] group and other groups visit [email or web address]  to help find a group, all meetings are virtual.     Facebook groups-  for more support when home- Babies & Mommies [facility] --- you can find mom-to-mom advice and the list of speaker topics for cradle talk program.     Helpful websites:    www.llli.org  www.[email or web address]  [email or web address]  Www.ppdil.org/    - The Postpartum Depression [place] [employer] [place]: Volunteer organization that  provide informed support and information to women and their families who are experiencing pregnancy and/or postpartum mood disorders either by phone or email.  Initiation of breast pumping after discharge:     - Add 1 pumping session a day, additional to the infant's feedings, 2-3 weeks after delivery.     - Pump both breasts for 15 mins, immediately after a breast feeding session.    - Pumping first thing in the morning will provide greater output.    - If you chose to pump more than once a day, you should be consistent every day to prevent a breast infection.      - Pump using an electric pump over a hands free pump (electric pumps provided stronger stimulation to the nipples).    - Store the breast milk in 2-3 oz containers.     - Label containers with date and time.    - Always feed oldest milk first.

## 2024-01-24 NOTE — PROGRESS NOTES
Comfortable s/p epidural  135/mod/occasional subtle prolonged decels  Cx 6/100/-1  Bloody fluid ongoing--check pt/INR, PTT, fibrinogen  Will CTM

## 2024-01-24 NOTE — ANESTHESIA PREPROCEDURE EVALUATION
Anesthesia PreOp Note    HPI:     Yanely Amador is a 34 year old female who presents for preoperative consultation requested by: * No surgeons listed *    Date of Surgery: 1/24/2024    * No procedures listed *  Indication: * No pre-op diagnosis entered *    Relevant Problems   No relevant active problems       NPO:                         History Review:  Patient Active Problem List    Diagnosis Date Noted    Pregnancy 01/24/2024    Anemia in preg-unspec 10/20/2023    COVID-19 affecting pregnancy in second trimester 09/25/2023    Genetic testing 06/28/2023    Recurrent urinary tract infection affecting pregnancy in first trimester 06/28/2023    Acute bilateral low back pain with left-sided sciatica 11/10/2015       Past Medical History:   Diagnosis Date    Acid reflux     Allergic rhinitis     Asthma     Concussion, without loss of consciousness, subsequent encounter 11/10/2015    resolved    Mild intermittent asthma        Past Surgical History:   Procedure Laterality Date    OTHER SURGICAL HISTORY  2010, 2011    foot surgeries x2; 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       Medications Prior to Admission   Medication Sig Dispense Refill Last Dose    Ferrous Sulfate ER (SLOW FE) 142 (45 Fe) MG Oral Tab CR Take 45 mg by mouth daily.   Past Week    prenatal vitamin with DHA 27-0.8-228 MG Oral Cap Take 1 capsule by mouth daily.   1/23/2024     Current Facility-Administered Medications Ordered in Epic   Medication Dose Route Frequency Provider Last Rate Last Admin    dextrose in lactated ringers 5% infusion   Intravenous Continuous Yury Ramos DO        lactated ringers infusion   Intravenous PRN Yury Ramos DO        lactated ringers IV bolus 500 mL  500 mL Intravenous PRN Yury Ramos DO        acetaminophen (Tylenol Extra Strength) tab 500 mg  500 mg Oral Q6H PRN Yury Ramos DO        acetaminophen (Tylenol Extra Strength) tab 1,000 mg   1,000 mg Oral Q6H PRN Yury Ramos DO        ibuprofen (Motrin) tab 600 mg  600 mg Oral Once PRN Yury Ramos DO        ondansetron (Zofran) 4 MG/2ML injection 4 mg  4 mg Intravenous Q6H PRN Yury Ramos DO        oxyTOCIN in sodium chloride 0.9% (Pitocin) 30 Units/500mL infusion premix  62.5-900 papo-units/min Intravenous Continuous Yuyr Ramos DO        terbutaline (Brethine) 1 MG/ML injection 0.25 mg  0.25 mg Subcutaneous PRN Yury Ramos DO        sodium citrate-citric acid (Bicitra) 500-334 MG/5ML oral solution 30 mL  30 mL Oral PRN Yury Ramos DO        lidocaine PF (Xylocaine-MPF) 1% injection  30 mL Intradermal Once Yury Ramos DO        lactated ringers IV bolus 1,000 mL  1,000 mL Intravenous Once Gary Bailey MD        fentaNYL-bupivacaine 2 mcg/mL-0.125% in sodium chloride 0.9% 100 mL EPIDURAL infusion premix   Epidural Continuous Gary Bailey MD        fentaNYL (Sublimaze) 50 mcg/mL injection 100 mcg  100 mcg Epidural Once Gary Bailey MD        bupivacaine PF (Marcaine) 0.25% injection  20 mL Epidural Once Gary Bailey MD        EPHEDrine (PF) 25 MG/5 ML injection 5 mg  5 mg Intravenous PRN Gary Bailey MD        nalbuphine (Nubain) 10 mg/mL injection 2.5 mg  2.5 mg Intravenous Q15 Min PRN Gary Bailey MD         No current Epic-ordered outpatient medications on file.       Allergies   Allergen Reactions    Loratadine OTHER (SEE COMMENTS)     Gas bubbles in intestines    gas   Gas bubbles in intestines   Gas bubbles in intestines    Nitrofurantoin ASTHMA, OTHER (SEE COMMENTS) and UNKNOWN     syncope    syncope   syncope    Penicillins DIZZINESS and SHORTNESS OF BREATH      Dizziness,H/A.SOB and throat that was numb and tingling    Pentasodium Triphosphate [Sodium Phosphate] RASH and FACE FLUSHING    Claritin      Gas bubbles in intestines    Clindamycin RASH and UNKNOWN       Family History   Problem Relation Age  of Onset    No Known Problems Father     High Cholesterol Mother     Cancer Mother         Thyroid    Asthma Mother     Diabetes Maternal Grandmother     Other (COPD) Maternal Grandmother     Thyroid Cancer Paternal Grandmother     Other (lung cancer) Maternal Aunt         also CHF    Breast Cancer Neg     Colon Cancer Neg      Social History     Socioeconomic History    Marital status:    Occupational History    Occupation: [employer]     Comment: full time     Employer: [employer]   Tobacco Use    Smoking status: Never    Smokeless tobacco: Never   Vaping Use    Vaping Use: Never used   Substance and Sexual Activity    Alcohol use: Not Currently     Comment: occasional prior to pregnancy    Drug use: No    Sexual activity: Yes     Partners: Male     Birth control/protection: OCP     Comment: Lacy       Available pre-op labs reviewed.  Lab Results   Component Value Date    WBC 13.7 (H) 01/24/2024    RBC 4.53 01/24/2024    HGB 13.1 01/24/2024    HCT 38.6 01/24/2024    MCV 85.2 01/24/2024    MCH 28.9 01/24/2024    MCHC 33.9 01/24/2024    RDW 14.1 01/24/2024    .0 01/24/2024             Vital Signs:  Body mass index is 27.46 kg/m².   weight is 72.6 kg (160 lb). Her oral temperature is 97.8 °F (36.6 °C). Her blood pressure is 118/72 and her pulse is 88. Her respiration is 18.   Vitals:    01/24/24 1150 01/24/24 1247 01/24/24 1300   BP: 117/75  118/72   Pulse: 71  88   Resp: 18     Temp: 98.7 °F (37.1 °C)  97.8 °F (36.6 °C)   TempSrc: Oral  Oral   Weight:  72.6 kg (160 lb)         Anesthesia Evaluation     Patient summary reviewed and Nursing notes reviewed    Airway   Mallampati: I  Dental      Pulmonary - normal exam   (+) asthma  Cardiovascular - negative ROS and normal exam  Exercise tolerance: good    NYHA Classification: I    Neuro/Psych    (+)  neuromuscular disease,        GI/Hepatic/Renal    (+) GERD    Endo/Other - negative ROS   Abdominal  - normal exam                 Anesthesia  Plan:   ASA:  2  Plan:   Epidural  Post-op Pain Management: Continuous epidural  Informed Consent Plan and Risks Discussed With:  Patient      I have informed Yanely Montverde and/or legal guardian or family member of the nature of the anesthetic plan, benefits, risks including possible dental damage if relevant, major complications, and any alternative forms of anesthetic management.   All of the patient's questions were answered to the best of my ability. The patient desires the anesthetic management as planned.  REBECCA WREN MD  1/24/2024 4:16 PM  Present on Admission:  **None**

## 2024-01-25 NOTE — LACTATION NOTE
LACTATION NOTE - MOTHER      Evaluation Type: Inpatient    Problems identified  Problems identified: Knowledge deficit;Unable to acheive sustained latch;Nipple pain;Inverted nipple(s)  Problems Identified Other: Left nipple inverted         Breastfeeding goal  Breastfeeding goal: To maintain breast milk feeding per patient goal    Maternal Assessment  Bilateral Breasts: Soft;Wide spaced  Right Nipple: Pink;Colostrum easily expressed;Slightly everted/short;Cracked  Left Nipple: Inverted;Colostrum easily expressed (Infant has not latched; hand expressed only on left breast)  Prior breastfeeding experience (comment below): Primip  Breastfeeding Assistance: Breastfeeding assistance provided with permission;Hand expression provided with permission    Pain assessment  Pain, additional: Pinching  Treatment of Sore Nipples: Deeper latch techniques    Guidelines for use of:  Breast pump type: Other (Hakaa)  Current use of pump:: using to express BM on left nipple  Suggested use of pump: Pump if infant is not latching to breast  Reported pumping volumes (ml): 1-2 mls  Other (comment): Mom reports unable to latch infant to left breast due to inverted nipple, using hakaa and BM given by spoon last feeding. Assisted with positioning in football on right breast, initially pinching but able to correct by obtaining deeper latch. Frequent sucking bursts obsered with few swallows heard. Plan for LC to attend next feeding for latch attempt on difficult left breast.

## 2024-01-25 NOTE — PROGRESS NOTES
Seen at 2030  Resting comfortably  Cat 1 tracing  UCs coupling  Cx 6/90/+1---cx feels swollen  Benadryl given  IUPC placed  Titrate pitocin to 200 mVU and max dose 30mU pit  POC d/w pt and family

## 2024-01-25 NOTE — L&D DELIVERY NOTE
Lyle Amador [[id number]]      Labor Events     labor?: No   steroids?: None  Rupture date/time: 2024 0330     Rupture type: SROM, AROM  Fluid color: Bright red, Meconium  Labor type: Spontaneous Onset of Labor  Augmentation: Oxytocin  Indications for augmentation: Ineffective Contraction Pattern  Intrapartum & labor complications: Late decelerations, Meconium, Shoulder Dystocia       Labor Event Times    Labor onset date/time: 2024 0915  Dilation complete date/time: 2024  Start pushing date/time: 2024        Presentation    Presentation: Vertex  Position: Right Occiput Anterior       Operative Delivery    Operative Vaginal Delivery: No                      Shoulder Dystocia    Shoulder Dystocia: Yes  Addtional staff called for assistance: Yes  Maneuver performed: Kirby, Suprapubic pressure on fetal shoulder           Anesthesia    Method: Epidural               Delivery      Head delivery date/time: 2024 22:52:01   Delivery date/time:  24 22:53:53   Delivery type: Normal spontaneous vaginal delivery    Details:     Delivery location: delivery room  Delivery Room Temperature: 73       Delivery Providers    Delivering Clinician: Yury Ramos DO   Delivery personnel:  Provider Role   Smiley Guzman, RN Baby Nurse   Andreea Argueta RN Delivery Nurse             Cord    Vessels: 3 Vessels  Complications: Nuchal  # of loops: 1  Timed cord clamping: No  Cord blood disposition: to lab  Gases sent?: Yes       Resuscitation    Method: Oxygen, Suctioning, PPV       [place] Measurements    No data filed       Placenta    Date/time: 2024 2301  Removal: Spontaneous  Appearance: Intact  Disposition: Discarded       Apgars    Living status: Living   Apgar Scoring Key:    0 1 2    Skin color Blue or pale Acrocyanotic Completely pink    Heart rate Absent <100 bpm >100 bpm    Reflex irritability No response Grimace Cry or active withdrawal     Muscle tone Limp Some flexion Active motion    Respiratory effort Absent Weak cry; hypoventilation Good, crying              1 Minute:  5 Minute:  10 Minute:  15 Minute:  20 Minute:      Skin color:        Heart rate:        Reflex irritablity:        Muscle tone:        Respiratory effort:        Total:                Skin to Skin    Skin to skin initiated date/time: 20247  Skin to skin with: Mother  Reason skin to skin not initiated: Maternal Acuity       Vaginal Count    Initial count RN: Jyotsna Ibarra RN Sponges Sharps    Initial counts 10   0    Final counts 10   1    Final count RN: Andreea Argueta RN  Final count MD: Yury Ramos DO       Delivery (Maternal)    Episiotomy: None  Perineal lacerations: 2nd Repaired?: Yes     Vaginal laceration?: No      Cervical laceration?: No    Clitoral laceration?: No                  [facility]    Vaginal Delivery Note    Yanely Roby Patient Status:  Inpatient    1990 MRN [id number]   Location [facility] Attending Yury Ramos DO   Hosp Day # 0 PCP Shoshana Webb MD     Delivery     Infant  Date of Delivery: 2024    Time of Delivery: 10:53 PM   Delivery Type: Normal spontaneous vaginal delivery     Infant Sex  Information for the patient's :  Lyle Amador [[id number]]   male     Infant Birthweight  Information for the patient's :  Lyle Amador [[id number]]   No birth weight on file.      Presentation Vertex [1]   Position Right [3]  Occiput [1]  Anterior [1]     Apgars:  1 minute:  4               5 minutes:   9                    Neonatologist Present: yes---yes called to bedside due to shoulder dystocia and meconium with poor tone after delivery      Placenta  Date/Time of Delivery: 2024 11:01 PM    Delivery: spontaneous  Placenta to Pathology: no      Cord Gases Submitted: yes  Cord Complications: single nuchal    Maternal Anesthesia: epidural     Episiotomy/Laceration  Repair   2nd degree perineal lac repaired with 2/0 vicryl     Delivery Complications  Shoulder Dystocia: shoulder effected left. Maneuvers attempted: Kirby and suprapubic pressure . Duration 2m60crt.    Quantitative Blood Loss (mL)        EBL:  300 cc    Delivery Comment:     Baby TRISH. Tight nuchal x 1 reduced.  Shoulder dystocia encountered.  Emergency called and extra staff to room.  Kirby and suprapubic employed with success.  Pt had difficulty pushing remained of body out so despite duration of shoulder dystocia [id number], the actually delivery of the head to the shoulder was closer to ~45 sec.   Poor tone noted after delivery of baby and cord clamped and cut immediately  Baby brought to the warmer immediately and DANIEL called.  Dr Zavala came to the bedside.  Placenta delivered by controlled cord traction intact with a 3 VC and intact. Normal uterine tone with oxytocin infusion. Repair as above with good hemostasis and anatomic re approximation. Sphincter intact.  Rectum and vagina clear of sponges. [place] and patient stable in the delivery room with nursing present. Sponge and needle counts verified.      Delivery findings discussed with pt and ; all questions answered    Baby moving left arm well in LDR after delivery      Yury Ramos,    2024  11:35 PM

## 2024-01-25 NOTE — PROGRESS NOTES
Patient up to bathroom with assist x 2.  Patient voided but unable to measure. Patient transferred to mother/baby room 358 per wheelchair in stable condition with baby and personal belongings.  Accompanied by significant other and staff.  Report given to mother/baby RN.

## 2024-01-25 NOTE — LACTATION NOTE
This note was copied from a baby's chart.  LACTATION NOTE - INFANT    Evaluation Type  Evaluation Type: Inpatient    Problems & Assessment  Problems Diagnosed or Identified: Latch difficulty;Shallow latch  Problems: comment/detail: Left inverted nipple  Infant Assessment: Hunger cues present  Muscle tone: Appropriate for GA    Feeding Assessment  Summary Current Feeding: Breastfeeding with breast milk supplement  Breastfeeding Assessment: Assisted with breastfeeding w/mother's permission;Sustained nutrititive latch w/audible swallows;Coordinated suck/swallow;Tolerated feeding well;Sleepy infant, quickly pacifies  Breastfeeding lasted # of minutes: 7  Breastfeeding Positions: right breast  Latch: Repeated attempts, hold nipple in mouth, stimulate to suck  Audible Sucks/Swallows: A few with stimulation  Type of Nipple: Everted (after stimulation) (Right nipple)  Comfort (Breast/Nipple): Filling, red/small blisters/bruises, mild/mod discomfort  Hold (Positioning): Full assist, teach one side, mother does other, staff holds  LATCH Score: 6

## 2024-01-25 NOTE — ANESTHESIA POSTPROCEDURE EVALUATION
Patient: Yanely Amador    Procedure Summary       Date: 01/24/24 Room / Location:     Anesthesia Start: 1633 Anesthesia Stop: 2301    Procedure: LABOR ANALGESIA Diagnosis:     Scheduled Providers:  Anesthesiologist: Claudette Monson DO    Anesthesia Type: epidural ASA Status: 2            Anesthesia Type: epidural    Vitals Value Taken Time   /64 01/25/24 0000   Temp 98.7 01/25/24 0129   Pulse 106 01/25/24 0000   Resp 20 01/25/24 0133   SpO2 98 % 01/24/24 2315       EM AN Post Evaluation:   Patient Evaluated in floor  Patient Participation: complete - patient participated  Level of Consciousness: awake and alert  Pain Score: 0  Pain Management: satisfactory to patient  Airway Patency:patent  Yes    Cardiovascular Status: hemodynamically stable  Respiratory Status: nonlabored ventilation, spontaneous ventilation and room air  Postoperative Hydration stable      Claudette Monson DO  1/25/2024 1:33 AM

## 2024-01-25 NOTE — DISCHARGE SUMMARY
[facility]    Discharge Summary    Yanely Amador Patient Status:  Inpatient    1990 MRN [id number]   Location [facility] Attending Yury Ramos,    Hosp Day # 0       Delivering OB Clinician: Dr. Ramos    EDC: Estimated Date of Delivery: 24    Gestational Age: 40w4d    Antepartum complications:   Patient Active Problem List   Diagnosis    Acute bilateral low back pain with left-sided sciatica    Genetic testing    Recurrent urinary tract infection affecting pregnancy in first trimester    COVID-19 affecting pregnancy in second trimester    Anemia in preg-unspec    Pregnancy       Date of Delivery: 2024  Time of Delivery: 10:53 PM     Delivery Type: spontaneous vaginal delivery    Baby: Liveborn male   Information for the patient's :  Lyle Amador [[id number]]   No birth weight on file.   Apgars:  1 minute:    5 minutes:  10 minutes:       Intrapartum Complications: Shoulder Dystocia    Admission date: 24  Discharge Date: 24    Hospital Course: 33 yo  @ 40w4d presents with labor.  Had shoulder dystocia x [id number].  Relieved with Kirby and suprapubic pressure.  From delivery of head to shoulders ~ 45sec, but pt had trouble pushing baby out which made it harder/longer to deliver remainder of baby.  Routine delivery and postpartum care    Discharge Physical Exam:   /63   Pulse (!) 128   Temp 98.1 °F (36.7 °C) (Oral)   Resp 18   Wt 160 lb (72.6 kg)   LMP 04/15/2023   SpO2 96%   Breastfeeding Yes   BMI 27.46 kg/m²   General appearance:  alert, appears stated age, cooperative and no distress  Abdominal: soft, non-tender, no rebound  Uterus: firm, nontender, below umbilicus  Pelvic: deferred  Extremities: Homans sign is negative, no sign of DVT    Discharged Condition: stable    Disposition: home    Plan:     Follow-up appointment in 6 weeks with Dr. Ramos

## 2024-01-25 NOTE — PLAN OF CARE
Problem: PAIN - ADULT  Goal: Verbalizes/displays adequate comfort level or patient's stated pain goal  Description: INTERVENTIONS:  - Encourage pt to monitor pain and request assistance  - Assess pain using appropriate pain scale  - Administer analgesics based on type and severity of pain and evaluate response  - Implement non-pharmacological measures as appropriate and evaluate response  - Consider cultural and social influences on pain and pain management  - Manage/alleviate anxiety  - Utilize distraction and/or relaxation techniques  - Monitor for opioid side effects  - Notify MD/LIP if interventions unsuccessful or patient reports new pain  - Anticipate increased pain with activity and pre-medicate as appropriate  Outcome: Progressing     Problem: POSTPARTUM  Goal: Long Term Goal:Experiences normal postpartum course  Description: INTERVENTIONS:  - Assess and monitor vital signs and lab values.  - Assess fundus and lochia.  - Provide ice/sitz baths for perineum discomfort.  - Monitor healing of incision/episiotomy/laceration, and assess for signs and symptoms of infection and hematoma.  - Assess bladder function and monitor for bladder distention.  - Provide/instruct/assist with pericare as needed.  - Provide VTE prophylaxis as needed.  - Monitor bowel function.  - Encourage ambulation and provide assistance as needed.  - Assess and monitor emotional status and provide social service/psych resources as needed.  - Utilize standard precautions and use personal protective equipment as indicated. Ensure aseptic care of all intravenous lines and invasive tubes/drains.  - Obtain immunization and exposure to communicable diseases history.  Outcome: Progressing  Goal: Optimize infant feeding at the breast  Description: INTERVENTIONS:  - Initiate breast feeding within first hour after birth.   - Monitor effectiveness of current breast feeding efforts.  - Assess support systems available to mother/family.  - Identify  cultural beliefs/practices regarding lactation, letdown techniques, maternal food preferences.  - Assess mother's knowledge and previous experience with breast feeding.  - Provide information as needed about early infant feeding cues (e.g., rooting, lip smacking, sucking fingers/hand) versus late cue of crying.  - Discuss/demonstrate breast feeding aids (e.g., infant sling, nursing footstool/pillows, and breast pumps).  - Encourage mother/other family members to express feelings/concerns, and actively listen.  - Educate father/SO about benefits of breast feeding and how to manage common lactation challenges.  - Recommend avoidance of specific medications or substances incompatible with breast feeding.  - Assess and monitor for signs of nipple pain/trauma.  - Instruct and provide assistance with proper latch.  - Review techniques for milk expression (breast pumping) and storage of breast milk. Provide pumping equipment/supplies, instructions and assistance, as needed.  - Encourage rooming-in and breast feeding on demand.  - Encourage skin-to-skin contact.  - Provide LC support as needed.  - Assess for and manage engorgement.  - Provide breast feeding education handouts and information on community breast feeding support.   Outcome: Progressing  Goal: Establishment of adequate milk supply with medication/procedure interruptions  Description: INTERVENTIONS:  - Review techniques for milk expression (breast pumping).   - Provide pumping equipment/supplies, instructions, and assistance until it is safe to breastfeed infant.  Outcome: Progressing  Goal: Appropriate maternal -  bonding  Description: INTERVENTIONS:  - Assess caregiver- interactions.  - Assess caregiver's emotional status and coping mechanisms.  - Encourage caregiver to participate in  daily care.  - Assess support systems available to mother/family.  - Provide /case management support as needed.  Outcome: Progressing

## 2024-01-25 NOTE — PROGRESS NOTES
[facility]    OB/Gyne Post  Progress Note      Yanely Amador Patient Status:  Inpatient    1990 MRN [id number]   Location [facility] 3SE Attending Yury Ramos, DO   Hosp Day # 1 PCP Shoshana Webb MD       Subjective     Good pain control. Tolerating present diet. Ambulating well. Voiding freely.  She denies headache, fever, chest pain, shortness of breath, dizziness upon ambulation nor leg pain.     Objective   Vital signs in last 24 hours:  Vitals:    24 0000 24 0129 24 0225 24 0443   BP: 120/64  117/76 101/68   Pulse: 106  85 86   Resp:   20 18   Temp:  98.7 °F (37.1 °C) 98 °F (36.7 °C) 98.8 °F (37.1 °C)   TempSrc:  Oral Oral Oral   SpO2:       Weight:            Input/Output:    Intake/Output Summary (Last 24 hours) at 2024 0904  Last data filed at 2024 0816  Gross per 24 hour   Intake 96 ml   Output 1763 ml   Net -1667 ml       AVSS  Constitutional: comfortable  Abdomen: soft nontender  Uterus: fundus below umbilicus, non tender  Extremities: No calf tenderness, SCDs on while in bed, 1+ pitting edema.      Results:   Labs / Path / Radiology:    Recent Results (from the past 24 hour(s))   CBC W/ DIFFERENTIAL    Collection Time: 24  1:20 PM   Result Value Ref Range    WBC 13.7 (H) 4.0 - 11.0 x10(3) uL    RBC 4.53 3.80 - 5.30 x10(6)uL    HGB 13.1 12.0 - 16.0 g/dL    HCT 38.6 35.0 - 48.0 %    MCV 85.2 80.0 - 100.0 fL    MCH 28.9 26.0 - 34.0 pg    MCHC 33.9 31.0 - 37.0 g/dL    RDW-SD 43.5 35.1 - 46.3 fL    RDW 14.1 11.0 - 15.0 %    .0 150.0 - 450.0 10(3)uL    Neutrophil Absolute Prelim 10.55 (H) 1.50 - 7.70 x10 (3) uL    Neutrophil Absolute 10.55 (H) 1.50 - 7.70 x10(3) uL    Lymphocyte Absolute 2.11 1.00 - 4.00 x10(3) uL    Monocyte Absolute 0.75 0.10 - 1.00 x10(3) uL    Eosinophil Absolute 0.07 0.00 - 0.70 x10(3) uL    Basophil Absolute 0.04 0.00 - 0.20 x10(3) uL    Immature Granulocyte Absolute 0.20 0.00 - 1.00 x10(3) uL     Neutrophil % 76.8 %    Lymphocyte % 15.4 %    Monocyte % 5.5 %    Eosinophil % 0.5 %    Basophil % 0.3 %    Immature Granulocyte % 1.5 %   ABORH (Blood Type)    Collection Time: 24  1:21 PM   Result Value Ref Range    ABO BLOOD TYPE O     RH BLOOD TYPE Positive    Antibody Screen    Collection Time: 24  1:21 PM   Result Value Ref Range    Antibody Screen Negative    Fibrinogen Activity    Collection Time: 24  5:14 PM   Result Value Ref Range    Fibrinogen 559 (H) 180 - 480 mg/dL   Prothrombin Time (PT)    Collection Time: 24  5:14 PM   Result Value Ref Range    PT 12.9 11.6 - 14.8 seconds    INR 0.92 0.80 - 1.20   PTT, Activated    Collection Time: 24  5:14 PM   Result Value Ref Range    PTT 25.2 23.3 - 35.6 seconds   CBC W/ DIFFERENTIAL    Collection Time: 24  5:41 AM   Result Value Ref Range    WBC 18.2 (H) 4.0 - 11.0 x10(3) uL    RBC 3.61 (L) 3.80 - 5.30 x10(6)uL    HGB 10.5 (L) 12.0 - 16.0 g/dL    HCT 30.8 (L) 35.0 - 48.0 %    MCV 85.3 80.0 - 100.0 fL    MCH 29.1 26.0 - 34.0 pg    MCHC 34.1 31.0 - 37.0 g/dL    RDW-SD 43.9 35.1 - 46.3 fL    RDW 14.1 11.0 - 15.0 %    .0 150.0 - 450.0 10(3)uL    Neutrophil Absolute Prelim 15.55 (H) 1.50 - 7.70 x10 (3) uL    Neutrophil Absolute 15.55 (H) 1.50 - 7.70 x10(3) uL    Lymphocyte Absolute 1.46 1.00 - 4.00 x10(3) uL    Monocyte Absolute 0.97 0.10 - 1.00 x10(3) uL    Eosinophil Absolute 0.02 0.00 - 0.70 x10(3) uL    Basophil Absolute 0.04 0.00 - 0.20 x10(3) uL    Immature Granulocyte Absolute 0.16 0.00 - 1.00 x10(3) uL    Neutrophil % 85.5 %    Lymphocyte % 8.0 %    Monocyte % 5.3 %    Eosinophil % 0.1 %    Basophil % 0.2 %    Immature Granulocyte % 0.9 %       Specimens (From admission, onward)      None            No results found.      Assessment/Plan   34 year oldyo  , s/p spontaneous vaginal, PPD# 1     Patient Active Problem List   Diagnosis    Acute bilateral low back pain with left-sided sciatica    Genetic testing     Recurrent urinary tract infection affecting pregnancy in first trimester    COVID-19 affecting pregnancy in second trimester    Anemia in preg-unspec    Pregnancy   .    ambulate, continue routine postpartum care      The patient had a male infant, and does desire circumcision.  She was consented for infant circumcision risks including, but not limited to, bleeding, infection, trauma to other tissue, and need for further procedures.  The patient expressed understanding, denied questions, and wishes to proceed with the procedure for her son.      Ariane Yo MD  1/25/2024  9:04 AM

## 2024-01-25 NOTE — PLAN OF CARE
Problem: PAIN - ADULT  Goal: Verbalizes/displays adequate comfort level or patient's stated pain goal  Description: INTERVENTIONS:  - Encourage pt to monitor pain and request assistance  - Assess pain using appropriate pain scale  - Administer analgesics based on type and severity of pain and evaluate response  - Implement non-pharmacological measures as appropriate and evaluate response  - Consider cultural and social influences on pain and pain management  - Manage/alleviate anxiety  - Utilize distraction and/or relaxation techniques  - Monitor for opioid side effects  - Notify MD/LIP if interventions unsuccessful or patient reports new pain  - Anticipate increased pain with activity and pre-medicate as appropriate  Outcome: Progressing     Problem: ANXIETY  Goal: Will report anxiety at manageable levels  Description: INTERVENTIONS:  - Administer medication as ordered  - Teach and rehearse alternative coping skills  - Provide emotional support with 1:1 interaction with staff  Outcome: Progressing

## 2024-01-25 NOTE — LACTATION NOTE
This note was copied from a baby's chart.  LACTATION NOTE - INFANT    Evaluation Type  Evaluation Type: Inpatient    Problems & Assessment  Problems Diagnosed or Identified: Latch difficulty  Problems: comment/detail: Left inverted nipple  Infant Assessment: Hunger cues present  Muscle tone: Appropriate for GA    Feeding Assessment  Summary Current Feeding: Breastfeeding with breast milk supplement  Breastfeeding Assessment: Assisted with breastfeeding w/mother's permission;Sustained nutrititive latch w/audible swallows;Coordinated suck/swallow;Tolerated feeding well  Breastfeeding lasted # of minutes: 10  Breastfeeding Positions: left breast;laid back  Latch: Repeated attempts, hold nipple in mouth, stimulate to suck  Audible Sucks/Swallows: Spontaneous and intermittent (24 hours old)  Type of Nipple: Inverted  Comfort (Breast/Nipple): Filling, red/small blisters/bruises, mild/mod discomfort  Hold (Positioning): Full assist, teach one side, mother does other, staff holds  LATCH Score: 5  Other (comment): In laid back position infant able to sustain latch for first feeeding on left breast (inverted nipple). Mom reports discomfort with few sucks only, upon removing infant small bruise observed on areola.

## 2024-01-26 NOTE — LACTATION NOTE
LACTATION NOTE - MOTHER      Evaluation Type: Inpatient    Problems identified  Problems identified: Knowledge deficit;Unable to acheive sustained latch;Milk supply WNL;Nipple trauma;Inverted nipple(s)  Problems Identified Other: Left nipple inverted, latch difficulty and nipple trauma/bleeding affecting right breast. \"Breast rest\" initiated overnight, pumping and feeding by bottle.         Breastfeeding goal  Breastfeeding goal: To maintain breast milk feeding per patient goal    Maternal Assessment  Bilateral Breasts: Filling  Bilateral Nipples: Healing well  Right Nipple: Bleeding;Sore;Slightly everted/short  Left Nipple: Inverted;Bruised  Prior breastfeeding experience (comment below): Primip  Breastfeeding Assistance: Breastfeeding assistance provided with permission    Pain assessment  Pain, additional: Pinching  Treatment of Sore Nipples: Hydrogel dressings as directed    Guidelines for use of:  Equipment: Hydrogel dressings  Breast pump type: Ameda Platinum;Medela Pump In Style MaxFlow;Spectra;Other (Hakaa + Medella hand pump)  Current use of pump:: q 3 hours  Suggested use of pump: For comfort as needed;Pump if infant is not latching to breast;Pump each time a supplement is offered  Reported pumping volumes (ml): 10-15 ml  Other (comment): Feeding plan is pumping and feeding by bottle, once milk comes in mom will resume breastfeeding and follow up with Lactation. Outpatient visit scheduled for 2/1/24 at 11 am.

## 2024-01-26 NOTE — TELEPHONE ENCOUNTER
Received [employer] FMLA forms through fax for patient, NO MABEL was signed and NO payment was collected. Sent to the forms department for completion.

## 2024-01-26 NOTE — PLAN OF CARE
Problem: BIRTH - VAGINAL/ SECTION  Goal: Fetal and maternal status remain reassuring during the birth process  Description: INTERVENTIONS:  - Monitor vital signs  - Monitor fetal heart rate  - Monitor uterine activity  - Monitor labor progression (vaginal delivery)  - DVT prophylaxis (C/S delivery)  - Surgical antibiotic prophylaxis (C/S delivery)  Outcome: Progressing     Problem: PAIN - ADULT  Goal: Verbalizes/displays adequate comfort level or patient's stated pain goal  Description: INTERVENTIONS:  - Encourage pt to monitor pain and request assistance  - Assess pain using appropriate pain scale  - Administer analgesics based on type and severity of pain and evaluate response  - Implement non-pharmacological measures as appropriate and evaluate response  - Consider cultural and social influences on pain and pain management  - Manage/alleviate anxiety  - Utilize distraction and/or relaxation techniques  - Monitor for opioid side effects  - Notify MD/LIP if interventions unsuccessful or patient reports new pain  - Anticipate increased pain with activity and pre-medicate as appropriate  Outcome: Progressing     Problem: ANXIETY  Goal: Will report anxiety at manageable levels  Description: INTERVENTIONS:  - Administer medication as ordered  - Teach and rehearse alternative coping skills  - Provide emotional support with 1:1 interaction with staff  Outcome: Progressing     Problem: POSTPARTUM  Goal: Long Term Goal:Experiences normal postpartum course  Description: INTERVENTIONS:  - Assess and monitor vital signs and lab values.  - Assess fundus and lochia.  - Provide ice/sitz baths for perineum discomfort.  - Monitor healing of incision/episiotomy/laceration, and assess for signs and symptoms of infection and hematoma.  - Assess bladder function and monitor for bladder distention.  - Provide/instruct/assist with pericare as needed.  - Provide VTE prophylaxis as needed.  - Monitor bowel function.  - Encourage  ambulation and provide assistance as needed.  - Assess and monitor emotional status and provide social service/psych resources as needed.  - Utilize standard precautions and use personal protective equipment as indicated. Ensure aseptic care of all intravenous lines and invasive tubes/drains.  - Obtain immunization and exposure to communicable diseases history.  Outcome: Progressing  Goal: Optimize infant feeding at the breast  Description: INTERVENTIONS:  - Initiate breast feeding within first hour after birth.   - Monitor effectiveness of current breast feeding efforts.  - Assess support systems available to mother/family.  - Identify cultural beliefs/practices regarding lactation, letdown techniques, maternal food preferences.  - Assess mother's knowledge and previous experience with breast feeding.  - Provide information as needed about early infant feeding cues (e.g., rooting, lip smacking, sucking fingers/hand) versus late cue of crying.  - Discuss/demonstrate breast feeding aids (e.g., infant sling, nursing footstool/pillows, and breast pumps).  - Encourage mother/other family members to express feelings/concerns, and actively listen.  - Educate father/SO about benefits of breast feeding and how to manage common lactation challenges.  - Recommend avoidance of specific medications or substances incompatible with breast feeding.  - Assess and monitor for signs of nipple pain/trauma.  - Instruct and provide assistance with proper latch.  - Review techniques for milk expression (breast pumping) and storage of breast milk. Provide pumping equipment/supplies, instructions and assistance, as needed.  - Encourage rooming-in and breast feeding on demand.  - Encourage skin-to-skin contact.  - Provide LC support as needed.  - Assess for and manage engorgement.  - Provide breast feeding education handouts and information on community breast feeding support.   Outcome: Progressing  Goal: Establishment of adequate milk  supply with medication/procedure interruptions  Description: INTERVENTIONS:  - Review techniques for milk expression (breast pumping).   - Provide pumping equipment/supplies, instructions, and assistance until it is safe to breastfeed infant.  Outcome: Progressing  Goal: Experiences normal breast weaning course  Description: INTERVENTIONS:  - Assess for and manage engorgement.  - Instruct on breast care.  - Provide comfort measures.  Outcome: Progressing  Goal: Appropriate maternal -  bonding  Description: INTERVENTIONS:  - Assess caregiver- interactions.  - Assess caregiver's emotional status and coping mechanisms.  - Encourage caregiver to participate in  daily care.  - Assess support systems available to mother/family.  - Provide /case management support as needed.  Outcome: Progressing

## 2024-01-26 NOTE — PROGRESS NOTES
Discharge order received. Discharge instructions and follow up reviewed with patient. Patient verbalized understanding.     Bands compared and matched with infant and removed.

## 2024-01-29 NOTE — TELEPHONE ENCOUNTER
From: Yanely Amador  To: Yury Ramos  Sent: 1/29/2024 9:51 AM CST  Subject: FMLA/STD    Hi team!    Please tell Dr. Ramos thank you for being an amazing OBYG and getting little Maurilio birthed into this world! First doctors appt went well! Happy healthy boy!     The reason why I am messaging is about my FMLA/STD paperwork that was faxed last week. I was following up to make sure it was received and started?     Thank you all!   Best,  Ruth HUGHES  
No

## 2024-01-31 NOTE — TELEPHONE ENCOUNTER
From: Yanely Amador  To: Yurysagrario Ramos  Sent: 2024 9:02 PM CST  Subject:  care     Hi team,    I ammessaging because I wanted to know a few things after having a baby. First was I am taking my prenatal still. But it’s been cloudy and I am having some general body/joint aches which I’m assuming is normal after a baby. Would be possible to take additional Mag/Vit D/calcium supplement while breast feeding? Just wanted to double check!     Thank you,  Ruth

## 2024-02-01 NOTE — TELEPHONE ENCOUNTER
Received second copy of FMLA forms for patient through fax, NO MABEL was signed and NO payment was collected. Sent to the forms department for review and completion.

## 2024-02-01 NOTE — TELEPHONE ENCOUNTER
PT called and left voicemail today (2/1/2024) at 1:48 pm -    She was calling in regards to her FMLA/STD form and their status. Sent her our dot phrase message on [employer], detailing when we got her form and our turnaround time.

## 2024-02-01 NOTE — TELEPHONE ENCOUNTER
[employer] forms received and logged for processing, sent mychart for nicci and fcr, other forms sent from [facility] are duplicates.

## 2024-02-01 NOTE — PROGRESS NOTES
LACTATION NOTE - MOTHER      Evaluation Type: Outpatient Initial    Problems identified  Problems identified: Milk supply WNL;Unable to acheive sustained latch;Knowledge deficit;Inverted nipple(s)  Problems Identified Other: Inverted left nipple; difficulty latching on left breast. Hx of bleeding/trauma to nipples. Pumping and bottle-feeding, re-introduced breastfeeding 3 days ago.         Breastfeeding goal  Breastfeeding goal: To maintain breast milk feeding per patient goal    Maternal Assessment  Bilateral Breasts: Full;Symmetrical  Bilateral Nipples: WNL  Right Nipple: Slightly everted/short  Left Nipple: Inverted  Prior breastfeeding experience (comment below): Primip  Breastfeeding Assistance: Breastfeeding assistance provided with permission    Pain assessment  Pain, additional: Pain w/initial sucks only  Treatment of Sore Nipples: Deeper latch techniques    Guidelines for use of:  Breast pump type: Medela Pump In Style MaxFlow  Current use of pump:: 3-4 times per day  Suggested use of pump: Pump if infant is not latching to breast;Pump each time a supplement is offered  Reported pumping volumes (ml): 4-6 oz             Situation:    C/O: Latch difficulty on left breast r/t inverted nipple    Background:    HX: Experienced nipple pain/trauma in first couple days and difficulty latching on left breast due to inverted nipple. Took break from breastfeeding and recently re-introduced 3 days ago. Now breastfeeding on right breast only twice a day and offers 3 oz by bottle for remaining feeds. Pumps 3-4 times per day and when offering right breast pumps left breast only, expresses about 4-6 oz with each pump session.     BW: 3750 g / 8#4.3 lbs  Last wt: 3756 g / 8#4.5 lbs at 5 days     Today's wt: 3748 g / 8#4.2 lbs at 8 days old (2 grams below birth weight)    Assessment:    Breastfeeding: In football hold, easily latched to right breast but initially a bit disorganized with intermittent clicking and losing latch  a few times. Able to correct by adjusting position and using weight of breast to provide chin support; now latch sustained with only occasional click. Then transferred to more challenging left breast, used nipple stimulation to help draw out nipple. Able to sustain latch after a few attempts, again used breast to help support chin with only occasional clicking observed. Frequent swallows heard throughout feeding, 20 minute breastfeeding session. Small spit up at end of feeding.  Total transfer of milk: 64 ml (34 ml right breast, 30 ml left breast)    Maternal Pumping: Not assessed    Recommendations:    Breastfeed on demand, recommend to begin on more challenging left breast as we continue to establish breastfeeding routine. Keep on breast as long as you continue to hear swallows, using breast compressions and gentle stimulation to maintain active sucking pattern. Empty first breast prior to offering second breast.  May offer 1 oz bottle supplement as needed.  Pump for 15-20 minutes when bottle-feeding in lieu of breastfeeding, when offering supplement, or if unable to latch onto left breast.  Return to see lactation as needed.

## 2024-02-02 NOTE — TELEPHONE ENCOUNTER
Pt delivered 1/24. Pt asking if okay to add calcium 600 mg with Vit D3 10 mcg softgel. She confirms she is still taking PNV as well. Pt is breastfeeding.     To YOLIS to advise- okay to take?

## 2024-02-02 NOTE — TELEPHONE ENCOUNTER
All are safe, but she should check with her PCP about getting a vit D level check to see if she needs supplementation

## 2024-02-14 NOTE — TELEPHONE ENCOUNTER
From: Yanely Amador  To: Yury Ramos  Sent: 2/14/2024 9:04 AM CST  Subject: Signature for FMLA    Good morning!    Hope all is well! I spoke with the forms department yesterday regarding FMLA/STD paperwork. They stated they finish filling out what they needed and faxed it to OBGYN office requiring a signature of the Physician. Please check to see if you have received it! Here is the number for the forms department too: [phone].   Thank you so much!    Best,  Yanely Amador

## 2024-03-17 NOTE — PROGRESS NOTES
HPI    Yanely Amador is a 34 year old female  here for 6 week post-partum visit.  Patient is breastfeeding.   Patient denies symptoms of depression, [place]  depression score below.  Bonding well with baby.  NO SI/HI.  Moving bowels and bladder w/o issues.  Lochia resolved.  Feels soreness recently along perineal repair.  Just recently noticed---thinks she overdid it.      [place]  DEPRESSION SCALE  I have been able to laugh and see the funny side of things: As much as I always could  I have looked forward with enjoyment to things: As much as I ever did  I have been anxious or worried for no good reason: Hardly ever  I have felt scared or panicky for no good reason: No, not at all  Things have been getting on top of me: No, I have been coping as well as ever  I have been so unhappy that I have had difficulty sleeping: Not at all  I have felt sad or miserable: No, not at all  I have been so unhappy that I have been crying: No, never  The thought of harming myself has occurred to me: Never  Total: 1    OBSTETRIC HISTORY  OB History    Para Term  AB Living   1 1 1 0 0 1   SAB IAB Ectopic Multiple Live Births   0 0 0 0 1      # Outcome Date GA Lbr Franky/2nd Weight Sex Delivery Anes PTL Lv   1 Term 24 40w4d 12:25 / 01:13 8 lb 4.3 oz (3.75 kg) M NORMAL SPONT EPI N PORFIRIO      Complications: Late decelerations, Meconium, Shoulder Dystocia       GYNE HISTORY        MEDICATIONS:    Current Outpatient Medications:     conjugated estrogens (PREMARIN) 0.625 MG/GM Vaginal Cream, Place 0.5 g vaginally As Directed. Place 0.5g to perineum nightly x 2 weeks, Disp: 42.5 g, Rfl: 0    prenatal vitamin with DHA 27-0.8-228 MG Oral Cap, Take 1 capsule by mouth daily., Disp: , Rfl:     Ferrous Sulfate ER (SLOW FE) 142 (45 Fe) MG Oral Tab CR, Take 45 mg by mouth daily. (Patient not taking: Reported on 3/6/2024), Disp: , Rfl:     ALLERGIES:    Allergies   Allergen Reactions    Loratadine OTHER  (SEE COMMENTS)     Gas bubbles in intestines    gas   Gas bubbles in intestines   Gas bubbles in intestines    Nitrofurantoin ASTHMA, OTHER (SEE COMMENTS) and UNKNOWN     syncope    syncope   syncope    Penicillins DIZZINESS and SHORTNESS OF BREATH      Dizziness,H/A.SOB and throat that was numb and tingling    Pentasodium Triphosphate [Sodium Phosphate] RASH and FACE FLUSHING    Claritin      Gas bubbles in intestines    Clindamycin RASH and UNKNOWN       PHYSICAL EXAM  Blood pressure 106/68, pulse 71, weight 137 lb 3.2 oz (62.2 kg), last menstrual period 04/15/2023, currently breastfeeding.  General:  Well nourished, well developed woman in no acute distress  Abdomen:  soft, nontender, no masses  External Genitalia: normal appearance, hair distribution, and no lesions  Vagina:  Normal appearance without lesions, no abnormal discharge  Cervix:  Normal without tenderness on motion  Uterus: normal in size, contour, position, mobility, without tenderness  Adnexa: normal without masses or tenderness  Perineum: at the introitus is a <10mm area of separation---silver nitrate applied  Anus: no hemorroids       ASSESSMENT/PLAN  Ruth was seen today for postpartum care and gyn problem.    Diagnoses and all orders for this visit:    Postpartum state (HCC)    Disruption of perineal wound, postpartum (HCC)    Other orders  -     conjugated estrogens (PREMARIN) 0.625 MG/GM Vaginal Cream; Place 0.5 g vaginally As Directed. Place 0.5g to perineum nightly x 2 weeks      Discussed all options of birthcontrol including ocps, minipill, Mirena or Paragard IUD, nuvaring, orthoevra patch, nexplanon, Depoprovera, condoms or tubal sterilization options. Patient has chosen condom.    Area of wound separation at the introitus---s/p silver nitrate application.  Recs for nightly E2 cream applied to the area to help enhance wound healing.  F/u in 2 weks for wound recheck    Patient to return for annual gyne exam in 6 months.     foot pain/injury

## 2024-03-20 NOTE — PROGRESS NOTES
Chief Complaint   Patient presents with    Follow - Up     C/o muscle cramping throughout the day, questions about meds          HPI:  The patient is here for 2 week check of perineal wound dehiscence seen at [facility].  Feeling well.  Has been using e2 cream.  No pain.  Having leg cramps at night.  +BF        Depression Screening (PHQ-2/PHQ-9): Over the LAST 2 WEEKS   Little interest or pleasure in doing things (over the last two weeks)?: Not at all    Feeling down, depressed, or hopeless (over the last two weeks)?: Not at all    PHQ-2 SCORE: 0            Reviewed medical and surgical history below       OBSTETRICS HISTORY:  OB History    Para Term  AB Living   1 1 1 0 0 1   SAB IAB Ectopic Multiple Live Births   0 0 0 0 1       GYNE HISTORY:  History   Sexual Activity    Sexual activity: Yes    Partners: Male    Birth control/ protection: OCP     Comment: Gianvi            MEDICAL HISTORY:  Past Medical History:   Diagnosis Date    Acid reflux     Allergic rhinitis     Asthma (HCC)     Concussion, without loss of consciousness, subsequent encounter 11/10/2015    resolved    Mild intermittent asthma (HCC)        SURGICAL HISTORY:  Past Surgical History:   Procedure Laterality Date    OTHER SURGICAL HISTORY  , 2011    foot surgeries x2; 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     Comment: full time     Employer: [employer]   Tobacco Use    Smoking status: Never    Smokeless tobacco: Never   Vaping Use    Vaping Use: Never used   Substance and Sexual Activity    Alcohol use: Not Currently     Comment: occasional prior to pregnancy    Drug use: No    Sexual activity: Yes      Partners: Male     Birth control/protection: OCP     Comment: Lacy   Other Topics Concern    Not on file   Social History Narrative    Single     No kids     Nursing school     Drinks alcohol - socially     Non smoker      Social Determinants of Health     Financial Resource Strain: Low Risk  (1/24/2024)    Financial Resource Strain     Difficulty of Paying Living Expenses: Not hard at all     Med Affordability: No   Food Insecurity: No Food Insecurity (1/24/2024)    Food Insecurity     Food Insecurity: Never true   Transportation Needs: No Transportation Needs (1/24/2024)    Transportation Needs     Lack of Transportation: No   Stress: No Stress Concern Present (1/24/2024)    Stress     Feeling of Stress : No   Housing Stability: Low Risk  (1/24/2024)    Housing Stability     Housing Instability: No     Housing Instability Emergency: Not on file       FAMILY HISTORY:  Family History   Problem Relation Age of Onset    No Known Problems Father     High Cholesterol Mother     Cancer Mother         Thyroid    Asthma Mother     Diabetes Maternal Grandmother     Other (COPD) Maternal Grandmother     Thyroid Cancer Paternal Grandmother     Other (lung cancer) Maternal Aunt         also CHF    Breast Cancer Neg     Colon Cancer Neg        MEDICATIONS:    Current Outpatient Medications:     conjugated estrogens (PREMARIN) 0.625 MG/GM Vaginal Cream, Place 0.5 g vaginally As Directed. Place 0.5g to perineum nightly x 2 weeks, Disp: 42.5 g, Rfl: 0    prenatal vitamin with DHA 27-0.8-228 MG Oral Cap, Take 1 capsule by mouth daily., Disp: , Rfl:     Ferrous Sulfate ER (SLOW FE) 142 (45 Fe) MG Oral Tab CR, Take 45 mg by mouth daily. (Patient not taking: Reported on 3/6/2024), Disp: , Rfl:     ALLERGIES:    Allergies   Allergen Reactions    Loratadine OTHER (SEE COMMENTS)     Gas bubbles in intestines    gas   Gas bubbles in intestines   Gas bubbles in intestines    Nitrofurantoin ASTHMA, OTHER (SEE COMMENTS) and UNKNOWN      syncope    syncope   syncope    Penicillins DIZZINESS and SHORTNESS OF BREATH      Dizziness,H/A.SOB and throat that was numb and tingling    Pentasodium Triphosphate [Sodium Phosphate] RASH and FACE FLUSHING    Claritin      Gas bubbles in intestines    Clindamycin RASH and UNKNOWN         Review of Systems:  Constitutional:  Denies fevers and chills   Cardiovascular:  denies chest pain or palpitations  Respiratory:  denies shortness of breath  Gastrointestinal:  denies heartburn, abdominal pain, diarrhea or constipation  Genitourinary:  denies dysuria, incontinence, abnormal vaginal discharge, vaginal itching  Musculoskeletal:  denies back pain.  Skin/Breast:  Denies any breast pain, lumps, or discharge.   Neurological:  denies headaches, extremity weakness  Psychiatric: denies depression or anxiety.      Vitals:    03/20/24 1143   BP: 116/72   Pulse: 89       PHYSICAL EXAM:   Constitutional: well developed, well nourished  Head/Face: normocephalic  Psychiatric: Appropriate mood and affect    Pelvic Exam:  External Genitalia: normal appearance, hair distribution, and no lesions  Urethral Meatus:  normal in size, location, without lesions and prolapse  Vagina:  Normal appearance without lesions, no abnormal discharge. Well healed breakdown site previously seen  Perineum: normal    Assessment/Plan:    Ruth was seen today for follow - up.    Diagnoses and all orders for this visit:    Disruption of perineal wound, postpartum (HCC)      Well healed site after e2 cream  Okay to be intimate/exercise  RTC annual exam   Magnesium rec'd for leg cramping at night and encouraged hydration

## 2024-04-19 NOTE — TELEPHONE ENCOUNTER
To Forms Dept- please advise what is needed. Pt states she was told [employer] needs to sign RTW clearance for FMLA. Is there something nurses need to do on our end?   (See 4/10 encounter).

## 2024-04-19 NOTE — TELEPHONE ENCOUNTER
From: Yanely Amador  To: Yury Ramos  Sent: 4/19/2024 11:58 AM CDT  Subject: Signature needed     [facility] team,     I spoke with the Forms Department regarding my return to work medical clearance form that was sent to their department on Tuesday 16th. They need a signature from Dr. Ramos to be able to fax it back to [facility]. Also, I have a RX request form breast pump company that was faxed over a few weeks ago. Please let me know if you received that as well.  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

## 2024-04-22 NOTE — TELEPHONE ENCOUNTER
Order received from [employer] for breast pump.   Form completed and faxed with confirmation.   Sent to scanning.

## 2024-05-07 NOTE — TELEPHONE ENCOUNTER
Issues Identified: (actual or potential)  Plugged duct and Pumping exclusively, 4+ months PP , pumping yield 5 oz/ each breast, pumps every 3.5 hours for 25 minutes for efficient emptying of breasts. Has RTW, pumps with spectra pump at work, Medela symphony at home, uses 24 mm flange, uncertain of proper fit. Takes prenatal vitamins with probiotics daily. Currently providing massage prior/during pumping sessions, ibuprofen, and cold compresses for management.     Education Provided  S/S and self-care for plugged duct.  Patietn to call OB physician if persisits >1-2 days and Reviewed/encouraged lymphatic drainage with gentle massage, use of lecithin 5-10 mg OTC, sizing of nipple for proper flange fit, gradually reduce pump session duration from 25 minutes to 15 mintues, collect infant volume needs + a little extra vs exacerbation of abundant supply.     [email or web address] recurring plugged duct link:        Follow Up  Call back as needed, Contact OB MD , and If symptoms worsen or do not resolve.    Mother's response  Mother verbalized understanding.

## 2024-09-09 NOTE — PROGRESS NOTES
Chief Complaint   Patient presents with    Gyn Exam     Annual          HPI:  The patient is a 33 yo F here for WWE.  +BF.  No menses.  Having pain with IC postpartum.      No LMP recorded. (Menstrual status: Breastfeeding).        2018     2:28 PM 2017     1:23 PM   RECENT PAP RESULTS   Thinprep Pap Negative for intraepithelial lesion or malignancy  Negative for intraepithelial lesion or malignancy         Hx Prior Abnormal Pap: No  Pap Date: 22  Pap Result Notes: Neg Pap/HPV  Follow Up Recommendation: NPN 23 YOLIS      Chaperone: declines      Depression Screening (PHQ-2/PHQ-9): Over the LAST 2 WEEKS   Little interest or pleasure in doing things (over the last two weeks)?: Not at all    Feeling down, depressed, or hopeless (over the last two weeks)?: Not at all    PHQ-2 SCORE: 0          Reviewed medical and surgical history below       OBSTETRICS HISTORY:  OB History    Para Term  AB Living   1 1 1 0 0 1   SAB IAB Ectopic Multiple Live Births   0 0 0 0 1       GYNE HISTORY:  History   Sexual Activity    Sexual activity: Yes    Partners: Male    Birth control/ protection: OCP     Comment: Gianvi            MEDICAL HISTORY:  Past Medical History:    Acid reflux    Allergic rhinitis    Asthma (HCC)    Concussion, without loss of consciousness, subsequent encounter    resolved    Mild intermittent asthma (HCC)       SURGICAL HISTORY:  Past Surgical History:   Procedure Laterality Date    Other surgical history  ,     foot surgeries x2; 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     Comment: full time     Employer: [employer]   Tobacco Use    Smoking  status: Never    Smokeless tobacco: Never   Vaping Use    Vaping status: Never Used   Substance and Sexual Activity    Alcohol use: Not Currently     Comment: occasional prior to pregnancy    Drug use: No    Sexual activity: Yes     Partners: Male     Birth control/protection: OCP     Comment: Lacy   Other Topics Concern    Not on file   Social History Narrative    Single     No kids     Nursing school     Drinks alcohol - socially     Non smoker      Social Determinants of Health     Financial Resource Strain: Low Risk  (1/24/2024)    Financial Resource Strain     Difficulty of Paying Living Expenses: Not hard at all     Med Affordability: No   Food Insecurity: No Food Insecurity (1/24/2024)    Food Insecurity     Food Insecurity: Never true   Transportation Needs: No Transportation Needs (1/24/2024)    Transportation Needs     Lack of Transportation: No   Stress: No Stress Concern Present (1/24/2024)    Stress     Feeling of Stress : No   Housing Stability: Low Risk  (1/24/2024)    Housing Stability     Housing Instability: No     Housing Instability Emergency: Not on file     Crib or Bassinette: Not on file       FAMILY HISTORY:  Family History   Problem Relation Age of Onset    No Known Problems Father     High Cholesterol Mother     Cancer Mother         Thyroid    Asthma Mother     Diabetes Maternal Grandmother     Other (COPD) Maternal Grandmother     Thyroid Cancer Paternal Grandmother     Other (lung cancer) Maternal Aunt         also CHF    Breast Cancer Neg     Colon Cancer Neg        MEDICATIONS:    Current Outpatient Medications:     prenatal vitamin with DHA 27-0.8-228 MG Oral Cap, Take 1 capsule by mouth daily., Disp: , Rfl:     conjugated estrogens (PREMARIN) 0.625 MG/GM Vaginal Cream, Place 0.5 g vaginally As Directed. Place 0.5g to perineum nightly x 2 weeks (Patient not taking: Reported on 9/9/2024), Disp: 42.5 g, Rfl: 0    Ferrous Sulfate ER (SLOW FE) 142 (45 Fe) MG Oral Tab CR, Take 45 mg by  mouth daily. (Patient not taking: Reported on 3/6/2024), Disp: , Rfl:     ALLERGIES:    Allergies   Allergen Reactions    Loratadine OTHER (SEE COMMENTS)     Gas bubbles in intestines    gas   Gas bubbles in intestines   Gas bubbles in intestines    Nitrofurantoin ASTHMA, OTHER (SEE COMMENTS) and UNKNOWN     syncope    syncope   syncope    Penicillins DIZZINESS and SHORTNESS OF BREATH      Dizziness,H/A.SOB and throat that was numb and tingling    Pentasodium Triphosphate [Sodium Phosphate] RASH and FACE FLUSHING    Claritin      Gas bubbles in intestines    Clindamycin RASH and UNKNOWN         Review of Systems:  Constitutional:  Denies fevers and chills   Cardiovascular:  denies chest pain or palpitations  Respiratory:  denies shortness of breath  Gastrointestinal:  denies heartburn, abdominal pain, diarrhea or constipation  Genitourinary:  denies dysuria, incontinence, abnormal vaginal discharge, vaginal itching  Musculoskeletal:  denies back pain.  Skin/Breast:  Denies any breast pain, lumps, or discharge.   Neurological:  denies headaches, extremity weakness  Psychiatric: denies depression or anxiety.      Vitals:    09/09/24 1127   BP: 110/75   Pulse: 71       PHYSICAL EXAM:   Constitutional: well developed, well nourished  Head/Face: normocephalic  Neck/Thyroid: thyroid symmetric, no thyromegaly, no nodules, no adenopathy  Heart: Regular rate and rhythm   Lungs: clear to ascultation bilaterally   Lymphatic:no abnormal supraclavicular or axillary adenopathy is noted  Breast: normal without palpable masses, tenderness, asymmetry, nipple discharge, nipple retraction or skin changes  Abdomen:  soft, nontender, nondistended, no masses  Skin/Hair: no unusual rashes or bruises  Extremities: no edema, no cyanosis  Psychiatric: Appropriate mood and affect    Pelvic Exam:  External Genitalia: normal appearance, hair distribution, and no lesions  Urethral Meatus:  normal in size, location, without lesions and  prolapse  Bladder:  No fullness, masses or tenderness  Vagina:  Normal appearance without lesions, no abnormal discharge  Cervix:  Normal without tenderness on motion  Uterus: normal in size, contour, position, mobility, without tenderness  Adnexa: normal without masses or tenderness  Perineum: normal  Pelvic floor TTP b/l     Assessment/Plan:  Ruth was seen today for gyn exam.    Diagnoses and all orders for this visit:    Well woman exam    Dyspareunia in female  -     Pelvic Floor Therapy - [facility]    Cervical cancer screening        WWE:   Reviewed ASCCP guidelines with the patient -- Pap today  Contraception: condoms  PFPT for dyspareunia   Breast Health:     Mammo @ 42 yo  Encouraged monthly self breast exams with the patient   Discussed diet, exercise, MVIs with Vit D  Follow up in 1 yr for WWE

## 2024-10-03 NOTE — PROGRESS NOTES
PELVIC FLOOR EVALUATION:   Referring Provider: Richard  Diagnosis: Dyspareunia in female (N94.10)   SUZANNA, UUI        Date of onset: since 24 delivery    Precautions:  Latex Allergy, Drug Allergy Date of Service: 10/3/2024      PATIENT SUMMARY   Yanely Amador is a 34 year old female  who presents to therapy today with complaints of urinary leakage, pain with sex.  Current symptoms include: vaginal pain, urgency/frequency, pressure, incomplete emptying, post void dribble, and leakage  History of condition: Pt had vaginal delivery on 24 and has had varying levels of symptoms since. During pregnancy pt had ortho PT to address R sided low back and SI jt pain.    Pt describes pain level: current 0/10, best 0/10, worst 8-9/10.   Quality: intermittent    PMH: Past medical history was reviewed with Yanely. Significant findings include  has a past medical history of Acid reflux, Allergic rhinitis, Asthma ([facility]), Concussion, without loss of consciousness, subsequent encounter (11/10/2015), and Mild intermittent asthma ([facility]).   PSH: Past surgical history was reviewed with Yanely. 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     PLOF: Yanely describes prior level of function no urinary leakage or pain with sex prior to delivery.   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    Obstetrical/Gynecological history:  Pregnant Now: No   1/Para 1  Complications during pregnancy/delivery: vaginal, shoulder distocia, stage 2 tear, 1 hour push period  Last menstrual period: has not yet returned  Birth Control Method: condoms     URINARY HABITS  Types of symptoms: stress incontinence, urge incontinence, and incomplete  emptying  Events associated with the onset of urinary complaints: after delivery  Urodynamic Testing completed: no  Abdominal/Vaginal Pressure complaints: no  Urinary Frequency: unsure  Urine Stream: varies  Amount: a lot  Leaking occurs: sneeze, cough, laugh, lifting, turning patients at work  Episodes of Leakage: multiple times per day  Amount of leakage: varies  Pad use: no  Pad Change frequency: n/a  Nocturia: yes  Fluid Intake: about 100 ounces per day  Bladder irritants: no  Urine Stop test: unsure  Post void dribble: sometimes  Hovering: yes, at work and outside of the home  Empty bladder just in case: yes  Do you ever leak urine without knowing it? no     BOWEL HABITS  Types of symptoms: Constipation (mild)  Frequency of bowel movements: every other day  Stool consistency: Mongo Stool Scale: 4  Do you strain with defecation: No   Laxative/Stool softener use: yes, stool softener initially  Taking fiber supplement: No     SEXUAL HEALTH STATUS  Sexual [place] Status: attempted, but not currently  Pain with initial and/or deep penetration: both  Pain with pelvic exam/tampon use: not historically  Orgasm status: yes, but not as strong     ASSESSMENT  Yanely presents to physical therapy evaluation with primary c/o SUZANNA, UUI, pain with intercourse. The results of the objective tests and measures show pt with increased tension in PFM, decreased strength and coordination of PFM, decreased coordination of diaphragmatic breathing inverse to PFM contraction, decreased PFM endurance, pain with internal PFM palpation to R side sam LA and OI.  Functional deficits include but are not limited to inability to consistently hold back urine with urgency or sudden changes in IAP, pain.  Signs and symptoms are consistent with diagnosis of dyspareunia, SUZANNA, UUI. Pt and PT discussed evaluation findings, pathology, POC and HEP.  Pt voiced understanding and performs HEP correctly without reported pain. Skilled Pelvic Physical  Therapy is medically necessary to address the above impairments and reach functional goals.     OBJECTIVE:     Posture: mild forward flexed, rounded shoulder, PPT    Gait: WNL    Ortho and functional screens deferred to future session due to time constraints.     Informed consent for internal pelvic evaluation given: Yes     Internal Examination   Mons pubis: WNL  Labia majora: WNL  Labia minora: WNL  Urethral meatus: WNL  Introitus: WNL  Perineal body: WNL     Internal Palpation Right Left Comments   Superficial Transverse perineal tenderness tenderness     Bulbocavernosus tenderness minimal restriction     Ischiocavernosus minimal restriction and tenderness WNL     Deep Transverse Perineal minimal restriction and pain minimal restriction     Compress Urethrae/Spinc. Urethrovaginalis    minimal restriction WNL     Pubococcygeus/Pubovaginalis minimal restriction and tenderness minimal restriction     Iliococcygeus minimal restriction and pain minimal restriction     Coccygeus not tested not tested     Piriformis not tested not tested     Obturator internus moderate restriction and pain minimal restriction and tenderness        Pelvic Floor Muscle strength: (PERF= Power/Endurance/Reps/Fast) MMT: 2/3/-/0* (*unable to return to baseline between contractions)  External Anal Sphincter: intact anal wink  Accessory Muscle Use: abdominals      Tissue Laxity Test:  Anterior Wall: Min  Posterior Wall: WNL  Apical: WNL       Today's Treatment and Response:   Patient education was provided on objective findings of external and internal evaluation and expectations with treatment outcomes.   Theract: (10 min) Educated on pelvic anatomy and function with diagrams and pelvic model, bladder normatives, adequate hydration levels, stress/urge urinary incontinence strategies, diaphragmatic breathing for PNS activation and pelvic floor relaxation , and coordination of diaphragmatic breathing and pelvic floor contraction   :      HEP:  Patient was instructed in and issued a HEP for:   Access Code: [email or web address]  URL: [email or web address]  Program Notes  no kegels for now, sit down all the way on the toilet to adequately relax pelvic floor when urinating  Exercises  - Supine Diaphragmatic Breathing  - 1 x daily - 7 x weekly - 1 sets - 10 reps  - Seated Diaphragmatic Breathing  - 1 x daily - 7 x weekly - 1 sets - 10 reps  Patient Education  - Get To Know Your Pelvic Floor- Female  - What is Urinary Incontinence?  - Lifestyle Changes that Support Urinary Health     Charges: PT Yuli Low Complexity, 1 TA      Total Timed Treatment: 10 min     Total Treatment Time: 45 min      Based on clinical rationale and outcome measures, this evaluation involved Low Complexity decision making due to 1-2 personal factors/comorbidities, 3 body structures involved/activity limitations, and unstable symptoms including stress urinary incontinence, urge urinary incontinence, and pelvic pain  PLAN OF CARE:    Goals: (to be met in 10 visits)  Patient instructed on bladder irritants, increased water intake to 64 ounces /day     Patient to report urinary frequency to every 2-4 hours to allow for independent ADLs    Patient reports a reduction in SUZANNA from several x/ day to 0-1x/ day resulting in no need for pad use.    Patient is able to perform Levator Ani contraction inverse to diaphragmatic breathing to allow for pelvic brace with ADLs without valsalva.     Patient exhibits an increase in pelvic floor strength from 2/5 with 3 count hold to 4/5  with 10 count hold to allow for pelvic brace with ADLs.    Patient to utilize proper toileting posture to allow for at least every other day complete BM for improved bowel function.      Patient reports pelvic pain decreased by 75% at R side and in region of scar, in order to tolerate pelvic exam with speculum.     Patient understands importance of performing HEP to prevent reoccurrence of symptoms.      Frequency /  Duration: Patient will be seen for 1x/week or a total of 10 visits over a 90 day period.  Treatment will include: Gait training, Manual Therapy, Neuromuscular Re-education, Self-Care Home Management, Therapeutic Activities, Therapeutic Exercise, and Home Exercise Program instruction      Education or treatment limitation: None  Rehab Potential:good        Patient/Family/Caregiver was advised of these findings, precautions, and treatment options and has agreed to actively participate in planning and for this course of care.     Thank you for your referral. Please co-sign or sign and return this letter via fax as soon as possible to [phone]. If you have any questions, please contact me at Dept: [phone]     Sincerely,  Electronically signed by therapist: Araceli Easton PT  Physician's certification required: Yes  I certify the need for these services furnished under this plan of treatment and while under my care.     X___________________________________________________ Date____________________     Certification From: 10/3/2024  To:1/1/2025

## 2024-10-14 NOTE — PROGRESS NOTES
Diagnosis:    Dyspareunia in female (N94.10)   SUZANNA, UUI          Referring Provider: Yury Ramos  Date of Evaluation:    10/3/2024    Precautions:  Latex Allergy, Drug Allergy  Under 1 year postpartum Next MD visit:   none scheduled  Date of Surgery: n/a   Insurance Primary/Secondary: BCBS POS / N/A     # Auth Visits: no mary, no auth            Subjective: Pt reports she is doing breathing HEP    Pain: 4-5/10 scar pain      Objective:   Posture: no significant deviations    Range Of Motion  Lumbar AROM screen: grossly WFL, mild mary lumbar flex  LE AROM screen: grossly WNL      Strength (MMT) 5/5 LINDA LE except 4/5     Flexibility Summary: WNL LINDA LE except B hamstrings limited     Special Tests:none     Functional screen:  Double leg squat:ant pelvic tilt  Single leg squat: able but more difficult on R vs L  Single leg stance: R: 10 sec, L: 10 sec     Diastasis Recti: (finger width depth while contracted)  Above Umbilicus: 2  Umbilicus: 2  Below Umbilicus: 1    Rib flare/gripping present: No    Breathing Assessment  Difficulty coordination breathing with TA engagement/relaxation    Assessment: Pt with some B hamstring tightness L>R. Difficulty coordinating abdominal activation with diaphragmatic breathing pattern, will benefit from continuing to work on this. HEP upgraded with additional mobility and TA activation exercises. Pt will benefit from core strengthening.       Goals: (to be met in 10 visits)  Patient instructed on bladder irritants, increased water intake to 64 ounces /day (GOAL MET)     Patient to report urinary frequency to every 2-4 hours to allow for independent ADLs  (In progress)  Patient reports a reduction in SUZANNA from several x/ day to 0-1x/ day resulting in no need for pad use. (In progress)    Patient is able to perform Levator Ani contraction inverse to diaphragmatic breathing to allow for pelvic brace with ADLs without valsalva. (In progress)    Patient exhibits an increase in pelvic  floor strength from 2/5 with 3 count hold to 4/5  with 10 count hold to allow for pelvic brace with ADLs.    Patient to utilize proper toileting posture to allow for at least every other day complete BM for improved bowel function.      Patient reports pelvic pain decreased by 75% at R side and in region of scar, in order to tolerate pelvic exam with speculum.     Patient understands importance of performing HEP to prevent reoccurrence of symptoms. (In progress)    Plan: core strengthening, TA/breathing coordination, direct and indirect PFM lengthening  Date: 10/14/2024  TX#: 2/10 Date:                 TX#: 3/ Date:                 TX#: 4/ Date:                 TX#: 5/ Date:   Tx#: 6/   Therex: 25 min  B sh ext green TB x10, SA sh ext R/L x10  Quadruped TA activation x10  Child's pose diaphragmatic breathing x10  Quadruped hip add rocking stretch x10  Cat/cow x10       Theract: 13 min  Pt ed on findings of ortho screen and ed on how they may be impacting symptoms                     HEP:    Access Code: [email or web address]  URL: [email or web address]  Exercises  - Supine Diaphragmatic Breathing  - 1 x daily - 7 x weekly - 1 sets - 10 reps  - Seated Diaphragmatic Breathing  - 1 x daily - 7 x weekly - 1 sets - 10 reps  - Quadruped Diaphragmatic Breathing  - 1 x daily - 7 x weekly - 1 sets - 10 reps  - Child's Pose Stretch  - 1 x daily - 7 x weekly - 1 sets - 2-3 reps - 1 min hold  - Kneeling Adductor Stretch with Hip External Rotation  - 1 x daily - 7 x weekly - 1 sets - 10 reps  - Cat Cow  - 1 x daily - 7 x weekly - 1 sets - 10 reps       Charges: 1 TA, 2 TE       Total Timed Treatment: 38 min  Total Treatment Time: 38 min

## 2024-10-31 NOTE — PROGRESS NOTES
Diagnosis:    Dyspareunia in female (N94.10)   SUZANNA, UUI          Referring Provider: Yury Ramos  Date of Evaluation:    10/3/2024    Precautions:  Latex Allergy, Drug Allergy  Under 1 year postpartum Next MD visit:   none scheduled  Date of Surgery: n/a   Insurance Primary/Secondary: BCBS POS / N/A     # Auth Visits: no mary, no auth            Subjective: Pt reports things have been going good, doing HEP, pain has gotten a little better. Has taken motrin a few times and that has helped. Sitting down to pee at work now, and that is helping empty more with less or no post void dribble. Leakage is better, still not gone but improved. Pt got her period back for the first time last week.    Pain: 4/10 scar pain      Objective: see chart below      Assessment: Pt with good tolerance of exercise progression with no symptoms this session. Pt will continue to benefit from external PFM lengthening and B hip and core strengthening next session.       Goals: (to be met in 10 visits)  Patient instructed on bladder irritants, increased water intake to 64 ounces /day (GOAL MET)     Patient to report urinary frequency to every 2-4 hours to allow for independent ADLs  (GOAL MET)  Patient reports a reduction in SUZANNA from several x/ day to 0-1x/ day resulting in no need for pad use. (In progress)    Patient is able to perform Levator Ani contraction inverse to diaphragmatic breathing to allow for pelvic brace with ADLs without valsalva. (In progress)    Patient exhibits an increase in pelvic floor strength from 2/5 with 3 count hold to 4/5  with 10 count hold to allow for pelvic brace with ADLs. (In progress)    Patient to utilize proper toileting posture to allow for at least every other day complete BM for improved bowel function. (GOAL MET)     Patient reports pelvic pain decreased by 75% at R side and in region of scar, in order to tolerate pelvic exam with speculum. (In progress)     Patient understands importance of  performing HEP to prevent reoccurrence of symptoms. (In progress)    Plan: core strengthening, TA/breathing coordination, direct and indirect PFM lengthening  Date: 10/14/2024  TX#: 2/10 Date: 10/31/24                TX#: 3/10 Date:                 TX#: 4/ Date:                 TX#: 5/ Date:   Tx#: 6/   Therex: 25 min  B sh ext green TB x10, SA sh ext R/L x10  Quadruped TA activation x10  Child's pose diaphragmatic breathing x10  Quadruped hip add rocking stretch x10  Cat/cow x10 Therex: 38 min  Pelvic tilts on towel roll lengthwise x10  Lateral pelvic tilts in short kneeling on foam roller x10  B sh ext green TB 2x10, SA sh ext R/L x10 then SL R/L x10 ea  Shuttle DL 5c x10, SL 3c R/L x10  Step-ups R/L, forward x10 ea, lateral x10 ea  Birddog with TA activation x5  Quadruped alt hip ext with TA activation x10      Theract: 13 min  Pt ed on findings of ortho screen and ed on how they may be impacting symptoms                     HEP:    Access Code: [email or web address]  URL: [email or web address]  Exercises  - Supine Diaphragmatic Breathing  - 1 x daily - 7 x weekly - 1 sets - 10 reps  - Seated Diaphragmatic Breathing  - 1 x daily - 7 x weekly - 1 sets - 10 reps  - Quadruped Diaphragmatic Breathing  - 1 x daily - 7 x weekly - 1 sets - 10 reps  - Child's Pose Stretch  - 1 x daily - 7 x weekly - 1 sets - 2-3 reps - 1 min hold  - Kneeling Adductor Stretch with Hip External Rotation  - 1 x daily - 7 x weekly - 1 sets - 10 reps  - Cat Cow  - 1 x daily - 7 x weekly - 1 sets - 10 reps  - Lateral Step Up  - 1 x daily - 3-4 x weekly - 2 sets - 10 reps  - Bird Dog  - 1 x daily - 3-4 x weekly - 2 sets - 10 reps       Charges: 3 TE       Total Timed Treatment: 38 min  Total Treatment Time: 40 min

## 2024-11-04 NOTE — PROGRESS NOTES
Diagnosis:    Dyspareunia in female (N94.10)   SUZANNA, UUI          Referring Provider: Yury Ramos  Date of Evaluation:    10/3/2024    Precautions:  Latex Allergy, Drug Allergy  Under 1 year postpartum Next MD visit:   none scheduled  Date of Surgery: n/a   Insurance Primary/Secondary: BCBS POS / N/A     # Auth Visits: no mary, no auth            Subjective: Pt reports things are going ok, not much has changed.     Pain: 4/10 scar pain      Objective: see chart below      Assessment: Pt expressed frustration with pain with intercourse yesterday, does not feel symptoms have changed much with regards to scar. Pt may benefit from trial of internal work on scar for desensitization and on PFM with MFR. Worked on sequencing breathing with squat to more effectively engage core for stabilization throughout movement. Pt will continue to benefit from skilled pT intervention.       Goals: (to be met in 10 visits)  Patient instructed on bladder irritants, increased water intake to 64 ounces /day (GOAL MET)     Patient to report urinary frequency to every 2-4 hours to allow for independent ADLs  (GOAL MET)  Patient reports a reduction in SUZANNA from several x/ day to 0-1x/ day resulting in no need for pad use. (In progress)    Patient is able to perform Levator Ani contraction inverse to diaphragmatic breathing to allow for pelvic brace with ADLs without valsalva. (In progress)    Patient exhibits an increase in pelvic floor strength from 2/5 with 3 count hold to 4/5  with 10 count hold to allow for pelvic brace with ADLs. (In progress)    Patient to utilize proper toileting posture to allow for at least every other day complete BM for improved bowel function. (GOAL MET)     Patient reports pelvic pain decreased by 75% at R side and in region of scar, in order to tolerate pelvic exam with speculum. (In progress)     Patient understands importance of performing HEP to prevent reoccurrence of symptoms. (In progress)    Plan: core  strengthening, TA/breathing coordination, direct and indirect PFM lengthening  Date: 10/14/2024  TX#: 2/10 Date: 10/31/24                TX#: 3/10 Date: 11/4/24                TX#: 4/10 Date:                 TX#: 5/ Date:   Tx#: 6/   Therex: 25 min  B sh ext green TB x10, SA sh ext R/L x10  Quadruped TA activation x10  Child's pose diaphragmatic breathing x10  Quadruped hip add rocking stretch x10  Cat/cow x10 Therex: 38 min  Pelvic tilts on towel roll lengthwise x10  Lateral pelvic tilts in short kneeling on foam roller x10  B sh ext green TB 2x10, SA sh ext R/L x10 then SL R/L x10 ea  Shuttle DL 5c x10, SL 3c R/L x10  Step-ups R/L, forward x10 ea, lateral x10 ea  Birddog with TA activation x5  Quadruped alt hip ext with TA activation x10 Therex: 30 min  B sh ext green TB 2x10, SA sh ext R/L x10 then SL R/L x10 ea  Shuttle DL 5c x10, SL 3c R/L x10  Shuttle DL 5c x10, SL 3c R/L x10  Quadruped alt hip ext with TA activation x10  Birddog x5  Cat/cow x5  Quadruped hip hinge add stretch R/L   Sit>stand + exhale x10       Theract: 13 min  Pt ed on findings of ortho screen and ed on how they may be impacting symptoms  Theract: 8 min  Pt ed on wand options and potential benefit                   HEP:    Access Code: [email or web address]  URL: [email or web address]  Date: 11/04/2024  Prepared by: Araceli Easton  Program Notes  seated on towel roll or foam roller, lengthwise, practice pelvic tilts forward and back, can do for 60-90 seconds for a muscle release technique  Exercises  - Supine Diaphragmatic Breathing  - 1 x daily - 7 x weekly - 1 sets - 10 reps  - Seated Diaphragmatic Breathing  - 1 x daily - 7 x weekly - 1 sets - 10 reps  - Quadruped Diaphragmatic Breathing  - 1 x daily - 7 x weekly - 1 sets - 10 reps  - Child's Pose Stretch  - 1 x daily - 7 x weekly - 1 sets - 2-3 reps - 1 min hold  - Kneeling Adductor Stretch with Hip External Rotation  - 1 x daily - 7 x weekly - 1 sets - 10 reps  - Cat Cow  - 1 x daily -  7 x weekly - 1 sets - 10 reps  - Lateral Step Up  - 1 x daily - 3-4 x weekly - 2 sets - 10 reps  - Bird Dog  - 1 x daily - 3-4 x weekly - 2 sets - 10 reps  - Diaphragmatic Breathing to Reduce Intra-abdominal Pressure: Sit to Stand  - 1 x daily - 7 x weekly - 1 sets - 5-10 reps  - Squat with Chair Touch  - 1 x daily - 7 x weekly - 1 sets - 5-10 reps       Charges: 2 TE, 1 TA      Total Timed Treatment: 38 min  Total Treatment Time: 40 min

## 2024-11-14 NOTE — PROGRESS NOTES
Assessment & Plan     1. Shortness of breath    2. Cardiac arrhythmia, unspecified cardiac arrhythmia type    3. Tobacco use disorder    4. Gastroesophageal reflux disease, unspecified whether esophagitis present      Patient has been experiencing brief, sporadic episodes of dyspnea up to 1-2x/week for the past several years. No trigger nor pattern has been identified for these symptoms. He attributes the symptoms to exposure to wallpaper removal chemical spray. Workup in 2013 identified sinus dysrhythmia on holter. He reports murmur in the past, this was not heard on exam today. Discussed broad differential including anxiety, reflux, arrhythmia, ongoing tobacco use or other causes. Exam was unremarkable and vitals were stable. Discussed repeating leadless EKG for 1 week given the frequency of the episodes is 1-2x/week currently. Patient will have albuterol to use as needed. Educational information attached to AVS.   - Adult Leadless EKG Monitor 3 to 7 Days; Future  - albuterol (PROAIR HFA/PROVENTIL HFA/VENTOLIN HFA) 108 (90 Base) MCG/ACT inhaler; Inhale 1-2 puffs into the lungs every 6 hours as needed for shortness of breath / dyspnea or wheezing    56}     Nicotine/Tobacco Cessation:  He reports that he has been smoking cigarettes. He has a 20.00 pack-year smoking history. He has never used smokeless tobacco.  Nicotine/Tobacco Cessation Plan:   Information offered: Patient not interested at this time    Return in about 3 months (around 12/9/2022) for Return for scheduled annual checkup with PCP.    [facility] MOHIT Palomo is a 53 year old, presenting for the following health issues:  Shortness of Breath (Feels like he got the wind knocked out of him and his finger tips will get really cold)      History of Present Illness       COPD:  He presents for follow up of COPD.  Overall, COPD symptoms are stable since last visit. He has same as usual fatigue or  Diagnosis:    Dyspareunia in female (N94.10)   SUZANNA, UUI          Referring Provider: Yury Ramos  Date of Evaluation:    10/3/2024    Precautions:  Latex Allergy, Drug Allergy  Under 1 year postpartum Next MD visit:   none scheduled  Date of Surgery: n/a   Insurance Primary/Secondary: BCBS POS / N/A     # Auth Visits: no mary, no auth            Subjective: Pt reports things are going ok, has had a stressful week. Was able to get exercises in last week 3x. Pt has had increased scar pain over the past few days with increased stress.     Pain: 1/10 scar pain      Objective: see chart below  Pt declined internal manual PFM work this session, deferred to future session.        Assessment: Focused session on CNS downregulation, decreased tissue tension, mobility and core stabilization. Modified HEP for focus on 3 exercises for increased consistency. Pt will benefit to progression of SL exercises for core/hip complex stability to decreased demand of PFMs. Pt will also benefit from additional CNS downregulation strategies.        Goals: (to be met in 10 visits)  Patient instructed on bladder irritants, increased water intake to 64 ounces /day (GOAL MET)     Patient to report urinary frequency to every 2-4 hours to allow for independent ADLs  (GOAL MET)  Patient reports a reduction in SUZANNA from several x/ day to 0-1x/ day resulting in no need for pad use. (In progress)    Patient is able to perform Levator Ani contraction inverse to diaphragmatic breathing to allow for pelvic brace with ADLs without valsalva. (In progress)    Patient exhibits an increase in pelvic floor strength from 2/5 with 3 count hold to 4/5  with 10 count hold to allow for pelvic brace with ADLs. (In progress)    Patient to utilize proper toileting posture to allow for at least every other day complete BM for improved bowel function. (GOAL MET)     Patient reports pelvic pain decreased by 75% at R side and in region of scar, in order to tolerate  pelvic exam with speculum. (In progress)     Patient understands importance of performing HEP to prevent reoccurrence of symptoms. (In progress)    Plan: core strengthening, TA/breathing coordination, direct and indirect PFM lengthening  Date: 10/14/2024  TX#: 2/10 Date: 10/31/24                TX#: 3/10 Date: 11/4/24                TX#: 4/10 Date: 11/13/24                TX#: 5/10 Date:   Tx#: 6/   Therex: 25 min  B sh ext green TB x10, SA sh ext R/L x10  Quadruped TA activation x10  Child's pose diaphragmatic breathing x10  Quadruped hip add rocking stretch x10  Cat/cow x10 Therex: 38 min  Pelvic tilts on towel roll lengthwise x10  Lateral pelvic tilts in short kneeling on foam roller x10  B sh ext green TB 2x10, SA sh ext R/L x10 then SL R/L x10 ea  Shuttle DL 5c x10, SL 3c R/L x10  Step-ups R/L, forward x10 ea, lateral x10 ea  Birddog with TA activation x5  Quadruped alt hip ext with TA activation x10 Therex: 30 min  B sh ext green TB 2x10, SA sh ext R/L x10 then SL R/L x10 ea  Shuttle DL 5c x10, SL 3c R/L x10  Shuttle DL 5c x10, SL 3c R/L x10  Quadruped alt hip ext with TA activation x10  Birddog x5  Cat/cow x5  Quadruped hip hinge add stretch R/L   Sit>stand + exhale x10   Therex: 38 min  Hooklying trunk rotation x10  Pelvic tilts x10  March with pelvic tilts x10  Sidelying hip abduction R/L x10  Cat/cow x10  Birddog x10  B sh ext blue TB 2x10  Open book stretch R/L x5  Thread the needle R/L x3    Theract: 13 min  Pt ed on findings of ortho screen and ed on how they may be impacting symptoms  Theract: 8 min  Pt ed on wand options and potential benefit                   HEP:    Access Code: [email or web address]  URL: [email or web address]  Date: 11/13/2024  Prepared by: Araceli Easton  Exercises  - Supine Diaphragmatic Breathing  - 1 x daily - 7 x weekly - 1 sets - 10 reps  - Seated Diaphragmatic Breathing  - 1 x daily - 7 x weekly - 1 sets - 10 reps  - Quadruped Diaphragmatic Breathing  - 1 x daily - 7 x  shortness of breath with exertion and same as usual shortness of breath at rest.  He sometimes coughs and does not have change in sputum. No recent fever. He can walk greater than 2 miles without stopping to rest. He can walk 3 or more flights of stairs without resting.The patient has had no ED, urgent care, or hospital admissions because of COPD since the last visit.     Heart Failure:  He presents for follow up of heart failure. He is experiencing shortness of breath with rest and activity, which is slightly worse. He is not experiencing any lower extremity edema.   He denies orthopenea and is not coughing at night. Patient is not checking weight daily. He has recently had a None. He has no side effects from medications.  He has had no other medical visits for heart failure since the last visit.    Vascular Disease:  He presents for follow up of vascular disease.  He never takes nitroglycerin. He is not taking daily aspirin.    He eats 0-1 servings of fruits and vegetables daily.He consumes 8 sweetened beverage(s) daily.He exercises with enough effort to increase his heart rate 9 or less minutes per day.  He exercises with enough effort to increase his heart rate 3 or less days per week.   He is taking medications regularly.    Today's PHQ-9         PHQ-9 Total Score: 1    PHQ-9 Q9 Thoughts of better off dead/self-harm past 2 weeks :   Not at all    How difficult have these problems made it for you to do your work, take care of things at home, or get along with other people: Not difficult at all     Patient is a 53 year old male who presents today with complaint of ongoing shortness of breath. He says that this has been ongoing for several years. Review of chart shows that he was seen for his 1.5 years ago, on 03/17/2021, by family practice. He was also seen in 2013 for palpitations. Reviewed event monitor in 05/2013 which noted sinus dysrhythmia. Patient says that his symptoms began after removing wall paper with  "chemical spray. He recalls that he would feel short of breath for brief periods of time. He also reports increased stress around that time as well as he and his spouse were having a baby. Shortly thereafter they . Since this time he says that the symptoms, shortness of breath, will occur at random. He feels that the symptoms can be anticipated in that he will feel \"dazed\" prior to onset. The symptoms feel like having the wind knocked out of him, but last for roughly 1 minute. He is able to manage with deep breathing and calming himself. He is a tobacco user, stating that he will smoke a pack over 1-2 days and then nothing for the remainder of the week. Drinks caffeinated beverages such as diet mountain dew.     Review of Systems   Constitutional, HEENT, cardiovascular, pulmonary, gi and gu systems are negative, except as otherwise noted.          Objective    /78 (Cuff Size: Adult Regular)   Pulse 70   Temp 98  F (36.7  C) (Oral)   Ht 1.692 m (5' 6.61\")   Wt 58.7 kg (129 lb 8 oz)   SpO2 97%   BMI 20.52 kg/m    Body mass index is 20.52 kg/m .  Physical Exam   GENERAL: healthy, alert and no distress  EYES: Eyes grossly normal to inspection, PERRL and conjunctivae and sclerae normal  HENT: ear canals and TM's normal, nose and mouth without ulcers or lesions  NECK: no adenopathy, no asymmetry, masses, or scars and thyroid normal to palpation  RESP: lungs clear to auscultation - no rales, rhonchi or wheezes  CV: regular rate and rhythm, normal S1 S2, no S3 or S4, no murmur, click or rub, no peripheral edema and peripheral pulses strong  MS: no gross musculoskeletal defects noted, no edema  NEURO: Normal strength and tone, mentation intact and speech normal  PSYCH: mentation appears normal, affect normal/bright          " weekly - 1 sets - 10 reps  - Child's Pose Stretch  - 1 x daily - 7 x weekly - 1 sets - 2-3 reps - 1 min hold  - Kneeling Adductor Stretch with Hip External Rotation  - 1 x daily - 7 x weekly - 1 sets - 10 reps  - Cat Cow  - 1 x daily - 7 x weekly - 1 sets - 10 reps  - Lateral Step Up  - 1 x daily - 3-4 x weekly - 2 sets - 10 reps  - Bird Dog  - 1 x daily - 3-4 x weekly - 2 sets - 10 reps  - Diaphragmatic Breathing to Reduce Intra-abdominal Pressure: Sit to Stand  - 1 x daily - 7 x weekly - 1 sets - 5-10 reps  - Squat with Chair Touch  - 1 x daily - 7 x weekly - 1 sets - 5-10 reps  - Supine Butterfly Groin Stretch  - 1 x daily - 7 x weekly - 1 sets - 10-20 reps       Charges: 3 TE      Total Timed Treatment: 38 min  Total Treatment Time: 38 min

## 2024-11-18 NOTE — PROGRESS NOTES
Diagnosis:    Dyspareunia in female (N94.10)   SUZANNA, UUI          Referring Provider: Yury Ramos  Date of Evaluation:    10/3/2024    Precautions:  Latex Allergy, Drug Allergy  Under 1 year postpartum Next MD visit:   none scheduled  Date of Surgery: n/a   Insurance Primary/Secondary: BCBS POS / N/A     # Auth Visits: no mary, no auth            Subjective: Pt feels like scar pain has not gotten better or worse, has not been having pain with picking baby up.     Pain: 1/10 scar pain      Objective: see chart below  Informed consent for internal pelvic floor muscle assessment and treatment given: YES      Assessment: Pt ed on pelvic wand use this session, will benefit from trial of use of wand and also low level vibration feature of pelvic wand in order to address scar desensitization. Pt provided return demo of wand use, increased sensitivity and pain on R side with pt noting increased pain in lower R abdominal wall after use. Pt will benefit from trial of use of wand 3x over the next week for desensitization techniques, and exposure to stimulus at region of the scar and adjacent to scar.        Goals: (to be met in 10 visits)  Patient instructed on bladder irritants, increased water intake to 64 ounces /day (GOAL MET)     Patient to report urinary frequency to every 2-4 hours to allow for independent ADLs  (GOAL MET)  Patient reports a reduction in SUZANNA from several x/ day to 0-1x/ day resulting in no need for pad use. (In progress)    Patient is able to perform Levator Ani contraction inverse to diaphragmatic breathing to allow for pelvic brace with ADLs without valsalva. (In progress)    Patient exhibits an increase in pelvic floor strength from 2/5 with 3 count hold to 4/5  with 10 count hold to allow for pelvic brace with ADLs. (In progress)    Patient to utilize proper toileting posture to allow for at least every other day complete BM for improved bowel function. (GOAL MET)     Patient reports pelvic pain  decreased by 75% at R side and in region of scar, in order to tolerate pelvic exam with speculum. (In progress)     Patient understands importance of performing HEP to prevent reoccurrence of symptoms. (In progress)    Plan: core strengthening, TA/breathing coordination, direct and indirect PFM lengthening  Date: 10/14/2024  TX#: 2/10 Date: 10/31/24                TX#: 3/10 Date: 11/4/24                TX#: 4/10 Date: 11/13/24                TX#: 5/10 Date: 11/18/24  Tx#: 6/10   Therex: 25 min  B sh ext green TB x10, SA sh ext R/L x10  Quadruped TA activation x10  Child's pose diaphragmatic breathing x10  Quadruped hip add rocking stretch x10  Cat/cow x10 Therex: 38 min  Pelvic tilts on towel roll lengthwise x10  Lateral pelvic tilts in short kneeling on foam roller x10  B sh ext green TB 2x10, SA sh ext R/L x10 then SL R/L x10 ea  Shuttle DL 5c x10, SL 3c R/L x10  Step-ups R/L, forward x10 ea, lateral x10 ea  Birddog with TA activation x5  Quadruped alt hip ext with TA activation x10 Therex: 30 min  B sh ext green TB 2x10, SA sh ext R/L x10 then SL R/L x10 ea  Shuttle DL 5c x10, SL 3c R/L x10  Shuttle DL 5c x10, SL 3c R/L x10  Quadruped alt hip ext with TA activation x10  Birddog x5  Cat/cow x5  Quadruped hip hinge add stretch R/L   Sit>stand + exhale x10   Therex: 38 min  Hooklying trunk rotation x10  Pelvic tilts x10  March with pelvic tilts x10  Sidelying hip abduction R/L x10  Cat/cow x10  Birddog x10  B sh ext blue TB 2x10  Open book stretch R/L x5  Thread the needle R/L x3 Theract: 25 min  Pt ed on pelvic wand use   Theract: 13 min  Pt ed on findings of ortho screen and ed on how they may be impacting symptoms  Theract: 8 min  Pt ed on wand options and potential benefit  Manual: 8 min  Internal PFM STM/ MFR and scar desensitization                   HEP:    Access Code: [email or web address]  URL: [email or web address]  Date: 11/13/2024  Prepared by: Araceli Green  - Supine Diaphragmatic  Breathing  - 1 x daily - 7 x weekly - 1 sets - 10 reps  - Seated Diaphragmatic Breathing  - 1 x daily - 7 x weekly - 1 sets - 10 reps  - Quadruped Diaphragmatic Breathing  - 1 x daily - 7 x weekly - 1 sets - 10 reps  - Child's Pose Stretch  - 1 x daily - 7 x weekly - 1 sets - 2-3 reps - 1 min hold  - Kneeling Adductor Stretch with Hip External Rotation  - 1 x daily - 7 x weekly - 1 sets - 10 reps  - Cat Cow  - 1 x daily - 7 x weekly - 1 sets - 10 reps  - Lateral Step Up  - 1 x daily - 3-4 x weekly - 2 sets - 10 reps  - Bird Dog  - 1 x daily - 3-4 x weekly - 2 sets - 10 reps  - Diaphragmatic Breathing to Reduce Intra-abdominal Pressure: Sit to Stand  - 1 x daily - 7 x weekly - 1 sets - 5-10 reps  - Squat with Chair Touch  - 1 x daily - 7 x weekly - 1 sets - 5-10 reps  - Supine Butterfly Groin Stretch  - 1 x daily - 7 x weekly - 1 sets - 10-20 reps       Charges: 1 Man, 2 TA      Total Timed Treatment: 33 min  Total Treatment Time: 34 min

## 2024-11-19 NOTE — PATIENT INSTRUCTIONS
Pelvic Wand Use: 5-6 min, 3x per week to every other day  Use wand to gently apply pressure, to stretch down at a 45 degree angle to Right and Left sides, if tolerated after a few sessions you can start to apply gentle pressure straight down to pelvic floor muscles as well. You can use low vibration setting on pelvic wand, placed at level of scar tissue, or directly adjacent to scar tissue, at outer layers of pelvic floor muscles for 30-90 seconds to work on desensitization of the scar. Please send a [employer] message if you have any questions regarding use of the pelvic wand.

## 2024-11-25 NOTE — PROGRESS NOTES
Diagnosis:    Dyspareunia in female (N94.10)   SUZANNA, UUI          Referring Provider: Yury Ramos  Date of Evaluation:    10/3/2024    Precautions:  Latex Allergy, Drug Allergy  Under 1 year postpartum Next MD visit:   none scheduled  Date of Surgery: n/a   Insurance Primary/Secondary: BCBS POS / N/A     # Auth Visits: no mary, no auth            Subjective: Pt was able to do pelvic wand once, it went ok, pt was sore for about a day after. Pt had about a 5/10 pain the day after last PT session after doing internal PF work. Got her flu shot, got her period, worked 2 back to back 12s, and was just very sore and tired after. Pt has been sitting down to pee about 99% of the time. Did exercises yesterday.     Pain: 3/10 scar pain      Objective: see chart below  Pt on her period, declined internal manual PFM treatment this session.       Assessment: Pt's urinary urgency has improved overall, still some outstanding, and unable to delay urge for long. Pt still with daily leaking 1-2x/day. This session focused on coordination and timing of diaphragmatic breathing inverse to indirect TA+PFM activation with exercises, with increased time between reps in order to come to relaxed baseline between reps. PT will continue to benefit from PFM MFR at future session.       Goals: (to be met in 10 visits)  Patient instructed on bladder irritants, increased water intake to 64 ounces /day (GOAL MET)     Patient to report urinary frequency to every 2-4 hours to allow for independent ADLs  (GOAL MET)  Patient reports a reduction in SUZANNA from several x/ day to 0-1x/ day resulting in no need for pad use. (In progress, 1-2x/day amount that comes out is less)    Patient is able to perform Levator Ani contraction inverse to diaphragmatic breathing to allow for pelvic brace with ADLs without valsalva. (In progress)    Patient exhibits an increase in pelvic floor strength from 2/5 with 3 count hold to 4/5  with 10 count hold to allow for  pelvic brace with ADLs. (In progress)    Patient to utilize proper toileting posture to allow for at least every other day complete BM for improved bowel function. (GOAL MET)     Patient reports pelvic pain decreased by 75% at R side and in region of scar, in order to tolerate pelvic exam with speculum. (In progress)     Patient understands importance of performing HEP to prevent reoccurrence of symptoms. (In progress)    Plan: core strengthening, TA/breathing coordination, direct and indirect PFM lengthening  Date: 11/4/24                TX#: 4/10 Date: 11/13/24                TX#: 5/10 Date: 11/18/24  Tx#: 6/10 Date: 11/25/24  Tx#: 7/10   Therex: 30 min  B sh ext green TB 2x10, SA sh ext R/L x10 then SL R/L x10 ea  Shuttle DL 5c x10, SL 3c R/L x10  Shuttle DL 5c x10, SL 3c R/L x10  Quadruped alt hip ext with TA activation x10  Birddog x5  Cat/cow x5  Quadruped hip hinge add stretch R/L   Sit>stand + exhale x10   Therex: 38 min  Hooklying trunk rotation x10  Pelvic tilts x10  March with pelvic tilts x10  Sidelying hip abduction R/L x10  Cat/cow x10  Birddog x10  B sh ext blue TB 2x10  Open book stretch R/L x5  Thread the needle R/L x3 Theract: 25 min  Pt ed on pelvic wand use Therex: 40 min  Shuttle DL 5c x10, SL 4c R/L x10  Standing B calf stretch x1 min  B sh ext blue TB 2x10  SA sh ext + exhale blue TB R/L x12 ea  Modified table plank hip ext alt x10, hip flex alt x10  Bridge with exhale + B UE press into table x10   SL fig 4 bridge with exhale + SA press into table x10   Theract: 8 min  Pt ed on wand options and potential benefit  Manual: 8 min  Internal PFM STM/ MFR and scar desensitization                HEP:    Access Code: [email or web address]  URL: [email or web address]  Date: 11/13/2024  Prepared by: Araceli Easton  Exercises  - Supine Diaphragmatic Breathing  - 1 x daily - 7 x weekly - 1 sets - 10 reps  - Seated Diaphragmatic Breathing  - 1 x daily - 7 x weekly - 1 sets - 10 reps  - Quadruped  Diaphragmatic Breathing  - 1 x daily - 7 x weekly - 1 sets - 10 reps  - Child's Pose Stretch  - 1 x daily - 7 x weekly - 1 sets - 2-3 reps - 1 min hold  - Kneeling Adductor Stretch with Hip External Rotation  - 1 x daily - 7 x weekly - 1 sets - 10 reps  - Cat Cow  - 1 x daily - 7 x weekly - 1 sets - 10 reps  - Lateral Step Up  - 1 x daily - 3-4 x weekly - 2 sets - 10 reps  - Bird Dog  - 1 x daily - 3-4 x weekly - 2 sets - 10 reps  - Diaphragmatic Breathing to Reduce Intra-abdominal Pressure: Sit to Stand  - 1 x daily - 7 x weekly - 1 sets - 5-10 reps  - Squat with Chair Touch  - 1 x daily - 7 x weekly - 1 sets - 5-10 reps  - Supine Butterfly Groin Stretch  - 1 x daily - 7 x weekly - 1 sets - 10-20 reps       Charges: 3 TE      Total Timed Treatment: 40 min  Total Treatment Time: 40 min

## 2024-12-03 NOTE — PROGRESS NOTES
Diagnosis:    Dyspareunia in female (N94.10)   SUZANNA, UUI          Referring Provider: Yury Ramos  Date of Evaluation:    10/3/2024    Precautions:  Latex Allergy, Drug Allergy  Under 1 year postpartum Next MD visit:   none scheduled  Date of Surgery: n/a   Insurance Primary/Secondary: BCBS POS / N/A     # Auth Visits: no mary, no auth            Subjective: Pt reports she has had a good week. Did exercises, used the wand twice, some tenderness the next day. Scar pain has been better this week. Pt was able to have penetrative sex 2x this past week, with significantly decreased pain. First time she had done wand exercises earlier in the day. Pt did have 1 episode of incontinence, the morning after a 12 hour work shift, with sudden severe urge while she was holding son, and was unable to hold on the way to the bathroom.     Pain: scar pain not quantified this session      Objective: see chart below      Assessment: Scar pain is improved. SUZANNA has improved overall, down to only a few times per week. Still some urge incontinence, pt will benefit from trial of urge delay techniques of 3x strong PFM contractions followed by distraction for urge delay with normal paced walking to the bathroom after, in attempt to defer urination until ready. Pt also encouraged to track symptoms as they pertain to where she is in her menstrual cycle. Pt overall making good progress. Will benefit from internal PFM re-assessment at future session.       Goals: (to be met in 10 visits)  Patient instructed on bladder irritants, increased water intake to 64 ounces /day (GOAL MET)     Patient to report urinary frequency to every 2-4 hours to allow for independent ADLs  (GOAL MET)  Patient reports a reduction in SUZANNA from several x/ day to 0-1x/ day resulting in no need for pad use. (In progress, 1-2x/day amount that comes out is less)    Patient is able to perform Levator Ani contraction inverse to diaphragmatic breathing to allow for pelvic  brace with ADLs without valsalva. (In progress)    Patient exhibits an increase in pelvic floor strength from 2/5 with 3 count hold to 4/5  with 10 count hold to allow for pelvic brace with ADLs. (In progress)    Patient to utilize proper toileting posture to allow for at least every other day complete BM for improved bowel function. (GOAL MET)     Patient reports pelvic pain decreased by 75% at R side and in region of scar, in order to tolerate pelvic exam with speculum. (In progress - over 50% better)     Patient understands importance of performing HEP to prevent reoccurrence of symptoms. (In progress)    Plan: core strengthening, TA/breathing coordination, direct and indirect PFM lengthening  Date: 11/18/24  Tx#: 6/10 Date: 11/25/24  Tx#: 7/10 Date: 12/2/24  Tx#: 8/10   Theract: 25 min  Pt ed on pelvic wand use Therex: 40 min  Shuttle DL 5c x10, SL 4c R/L x10  Standing B calf stretch x1 min  B sh ext blue TB 2x10  SA sh ext + exhale blue TB R/L x12 ea  Modified table plank hip ext alt x10, hip flex alt x10  Bridge with exhale + B UE press into table x10   SL fig 4 bridge with exhale + SA press into table x10 Therex: 20 min  Shuttle DL 5c 2x10, SL 5c R/L 2x10  Squats with red TB 10# db center x10, R SA x10, L SA x10  Pelvic tilts SB ant/post x10, lateral x10, circles CW/CCW x5 ea     Manual: 8 min  Internal PFM STM/ MFR and scar desensitization  Theract: 23 min  Reviewed progress toward goals, outstanding deficits, home program, wand use and frequency             HEP:    Access Code: [email or web address]  URL: [email or web address]  Date: 11/13/2024  Prepared by: Araceli Easton  Exercises  - Supine Diaphragmatic Breathing  - 1 x daily - 7 x weekly - 1 sets - 10 reps  - Seated Diaphragmatic Breathing  - 1 x daily - 7 x weekly - 1 sets - 10 reps  - Quadruped Diaphragmatic Breathing  - 1 x daily - 7 x weekly - 1 sets - 10 reps  - Child's Pose Stretch  - 1 x daily - 7 x weekly - 1 sets - 2-3 reps - 1 min hold  -  Kneeling Adductor Stretch with Hip External Rotation  - 1 x daily - 7 x weekly - 1 sets - 10 reps  - Cat Cow  - 1 x daily - 7 x weekly - 1 sets - 10 reps  - Lateral Step Up  - 1 x daily - 3-4 x weekly - 2 sets - 10 reps  - Bird Dog  - 1 x daily - 3-4 x weekly - 2 sets - 10 reps  - Diaphragmatic Breathing to Reduce Intra-abdominal Pressure: Sit to Stand  - 1 x daily - 7 x weekly - 1 sets - 5-10 reps  - Squat with Chair Touch  - 1 x daily - 7 x weekly - 1 sets - 5-10 reps  - Supine Butterfly Groin Stretch  - 1 x daily - 7 x weekly - 1 sets - 10-20 reps       Charges: 1 TE, 2 TA      Total Timed Treatment: 43 min  Total Treatment Time: 43 min

## 2025-01-07 NOTE — PROGRESS NOTES
Discharge Summary  Pt has attended 9 visits in Physical Therapy.     Diagnosis:    Dyspareunia in female (N94.10)   SUZANNA, UUI          Referring Provider: Yury Ramos  Date of Evaluation:    10/3/2024    Precautions:  Latex Allergy, Drug Allergy  Under 1 year postpartum Next MD visit:   none scheduled  Date of Surgery: n/a   Insurance Primary/Secondary: BCBS POS / N/A     # Auth Visits: no mary, no auth            Subjective: Pt reports pain has been doing HEP pretty consistently every day for 10 min. 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 Urgency has not really improved, but doing PFM contractions to delay urge has been helping. A handful of episodes of leakage. Intimacy has improved significantly, per pt if she pays attention to timing of her menstrual cycle and stays consistent with HEP, she is able to have penetration without pain like before. Pt feels ready for discharge.     Pain: 1/10 at rest       Objective: see chart below  Informed consent for internal pelvic assessment given: YES  PFM MMT: 2/5 without pairing with exhale, 3/5 with paired exhale      Assessment: Pt with significant pain improvements, and all goals met or nearly met. Pt is consistent with home program, feels ready for discharge to indep symptoms management. Anticipate pt will continue to progress toward unmet goals with time, however pt understands that if symptoms change or increase she should follow up with referring provider and may benefit from PT assessment in the future as needed. Pt is appropriate for and agreeable to discharge to home program at this time.      Goals: (to be met in 10 visits)  Patient instructed on bladder irritants, increased water intake to 64 ounces /day (GOAL MET)     Patient to report urinary frequency to every 2-4 hours to allow for independent ADLs  (GOAL MET)  Patient reports a reduction in SUZANNA from several x/ day to 0-1x/ day resulting in no need for pad use.  (GOAL MET)    Patient is able to perform Levator Ani contraction inverse to diaphragmatic breathing to allow for pelvic brace with ADLs without valsalva. (GOAL MET)    Patient exhibits an increase in pelvic floor strength from 2/5 with 3 count hold to 4/5  with 10 count hold to allow for pelvic brace with ADLs. (Nearly met - 3/5 with exhale)    Patient to utilize proper toileting posture to allow for at least every other day complete BM for improved bowel function. (GOAL MET)     Patient reports pelvic pain decreased by 75% at R side and in region of scar, in order to tolerate pelvic exam with speculum. (Nearly met- over 50% better)     Patient understands importance of performing HEP to prevent reoccurrence of symptoms. (GOAL MET)      Plan: discharge  Date: 11/18/24  Tx#: 6/10 Date: 11/25/24  Tx#: 7/10 Date: 12/2/24  Tx#: 8/10 Date: 1/6/24  Tx#: 9/10   Theract: 25 min  Pt ed on pelvic wand use Therex: 40 min  Shuttle DL 5c x10, SL 4c R/L x10  Standing B calf stretch x1 min  B sh ext blue TB 2x10  SA sh ext + exhale blue TB R/L x12 ea  Modified table plank hip ext alt x10, hip flex alt x10  Bridge with exhale + B UE press into table x10   SL fig 4 bridge with exhale + SA press into table x10 Therex: 20 min  Shuttle DL 5c 2x10, SL 5c R/L 2x10  Squats with red TB 10# db center x10, R SA x10, L SA x10  Pelvic tilts SB ant/post x10, lateral x10, circles CW/CCW x5 ea   Theract: 23 min  Pt ed on progress towards goals and outcomes of PT, pt ed on findings of internal PFM re-assessment, reviewed wand use     Manual: 8 min  Internal PFM STM/ MFR and scar desensitization  Theract: 23 min  Reviewed progress toward goals, outstanding deficits, home program, wand use and frequency Therex: 10 min  Reviewed home program               HEP:    Access Code: [email or web address]  URL: [email or web address]  Date: 11/13/2024  Prepared by: Araceli Easton  Exercises  - Supine Diaphragmatic Breathing  - 1 x daily - 7 x weekly - 1  sets - 10 reps  - Seated Diaphragmatic Breathing  - 1 x daily - 7 x weekly - 1 sets - 10 reps  - Quadruped Diaphragmatic Breathing  - 1 x daily - 7 x weekly - 1 sets - 10 reps  - Child's Pose Stretch  - 1 x daily - 7 x weekly - 1 sets - 2-3 reps - 1 min hold  - Kneeling Adductor Stretch with Hip External Rotation  - 1 x daily - 7 x weekly - 1 sets - 10 reps  - Cat Cow  - 1 x daily - 7 x weekly - 1 sets - 10 reps  - Lateral Step Up  - 1 x daily - 3-4 x weekly - 2 sets - 10 reps  - Bird Dog  - 1 x daily - 3-4 x weekly - 2 sets - 10 reps  - Diaphragmatic Breathing to Reduce Intra-abdominal Pressure: Sit to Stand  - 1 x daily - 7 x weekly - 1 sets - 5-10 reps  - Squat with Chair Touch  - 1 x daily - 7 x weekly - 1 sets - 5-10 reps  - Supine Butterfly Groin Stretch  - 1 x daily - 7 x weekly - 1 sets - 10-20 reps       Charges: 1 TE, 2 TA      Total Timed Treatment: 33 min  Total Treatment Time: 35 min      Plan: Discharge with HEP. Patient was instructed to follow up with their physician should an exacerbation of symptoms arise.     Thank you for your referral. If you have any questions, please contact me at Dept: [phone].    Sincerely,  Electronically signed by therapist: Araceli Easton PT     Physician's certification required:  No

## 2025-02-06 NOTE — TELEPHONE ENCOUNTER
Per patient's [employer] message, pregnancy test was negative.  Any further recommendations at this time.  Patient was advised to monitor and call if the spotting occurs with her next cycle.   5

## 2025-02-06 NOTE — TELEPHONE ENCOUNTER
Ruthbernard Houstonshyla with one month of irregular spotting with some mild cramping. She has been having regular cycles since November ; that is when they resumed post delivery in Jan, 2024. She is no longer breastfeeding, nor pumping. Her cycles are 25-30 days apart and next one should be due on the 15th. Spotting is light pink, she does not need to wear a pad. Has noted some weight gain this month, but no other lifestyle changes. We gave recommendations to monitor and reach out if irregular the next 2 cycles.     Patient would like Dr. Ramos's review and recommendations. Thank you,

## (undated) NOTE — LETTER
Patient Name: Yanely Amador  YOB: 1990          MRN :  [id number]  Date:  1/8/2025  Referring Physician:  Yury Ramos    Discharge Summary  Pt has attended 9 visits in Physical Therapy.     Diagnosis:    Dyspareunia in female (N94.10)   SUZANNA, UUI          Referring Provider: Yury Ramos  Date of Evaluation:    10/3/2024    Precautions:  Latex Allergy, Drug Allergy  Under 1 year postpartum Next MD visit:   none scheduled  Date of Surgery: n/a   Insurance Primary/Secondary: BCBS POS / N/A     # Auth Visits: no mary, no auth            Subjective: Pt reports pain has been doing HEP pretty consistently every day for 10 min. 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 Urgency has not really improved, but doing PFM contractions to delay urge has been helping. A handful of episodes of leakage. Intimacy has improved significantly, per pt if she pays attention to timing of her menstrual cycle and stays consistent with HEP, she is able to have penetration without pain like before. Pt feels ready for discharge.     Pain: 1/10 at rest       Objective: see chart below  Informed consent for internal pelvic assessment given: YES  PFM MMT: 2/5 without pairing with exhale, 3/5 with paired exhale      Assessment: Pt with significant pain improvements, and all goals met or nearly met. Pt is consistent with home program, feels ready for discharge to indep symptoms management. Anticipate pt will continue to progress toward unmet goals with time, however pt understands that if symptoms change or increase she should follow up with referring provider and may benefit from PT assessment in the future as needed. Pt is appropriate for and agreeable to discharge to home program at this time.      Goals: (to be met in 10 visits)  Patient instructed on bladder irritants, increased water intake to 64 ounces /day (GOAL MET)     Patient to report urinary frequency to every 2-4 hours to  allow for independent ADLs  (GOAL MET)  Patient reports a reduction in SUZANNA from several x/ day to 0-1x/ day resulting in no need for pad use. (GOAL MET)    Patient is able to perform Levator Ani contraction inverse to diaphragmatic breathing to allow for pelvic brace with ADLs without valsalva. (GOAL MET)    Patient exhibits an increase in pelvic floor strength from 2/5 with 3 count hold to 4/5  with 10 count hold to allow for pelvic brace with ADLs. (Nearly met - 3/5 with exhale)    Patient to utilize proper toileting posture to allow for at least every other day complete BM for improved bowel function. (GOAL MET)     Patient reports pelvic pain decreased by 75% at R side and in region of scar, in order to tolerate pelvic exam with speculum. (Nearly met- over 50% better)     Patient understands importance of performing HEP to prevent reoccurrence of symptoms. (GOAL MET)      Plan: discharge  Date: 11/18/24  Tx#: 6/10 Date: 11/25/24  Tx#: 7/10 Date: 12/2/24  Tx#: 8/10 Date: 1/6/24  Tx#: 9/10   Theract: 25 min  Pt ed on pelvic wand use Therex: 40 min  Shuttle DL 5c x10, SL 4c R/L x10  Standing B calf stretch x1 min  B sh ext blue TB 2x10  SA sh ext + exhale blue TB R/L x12 ea  Modified table plank hip ext alt x10, hip flex alt x10  Bridge with exhale + B UE press into table x10   SL fig 4 bridge with exhale + SA press into table x10 Therex: 20 min  Shuttle DL 5c 2x10, SL 5c R/L 2x10  Squats with red TB 10# db center x10, R SA x10, L SA x10  Pelvic tilts SB ant/post x10, lateral x10, circles CW/CCW x5 ea   Theract: 23 min  Pt ed on progress towards goals and outcomes of PT, pt ed on findings of internal PFM re-assessment, reviewed wand use     Manual: 8 min  Internal PFM STM/ MFR and scar desensitization  Theract: 23 min  Reviewed progress toward goals, outstanding deficits, home program, wand use and frequency Therex: 10 min  Reviewed home program               HEP:    Access Code: [email or web address]  URL:  [email or web address]  Date: 11/13/2024  Prepared by: Araceli Easton  Exercises  - Supine Diaphragmatic Breathing  - 1 x daily - 7 x weekly - 1 sets - 10 reps  - Seated Diaphragmatic Breathing  - 1 x daily - 7 x weekly - 1 sets - 10 reps  - Quadruped Diaphragmatic Breathing  - 1 x daily - 7 x weekly - 1 sets - 10 reps  - Child's Pose Stretch  - 1 x daily - 7 x weekly - 1 sets - 2-3 reps - 1 min hold  - Kneeling Adductor Stretch with Hip External Rotation  - 1 x daily - 7 x weekly - 1 sets - 10 reps  - Cat Cow  - 1 x daily - 7 x weekly - 1 sets - 10 reps  - Lateral Step Up  - 1 x daily - 3-4 x weekly - 2 sets - 10 reps  - Bird Dog  - 1 x daily - 3-4 x weekly - 2 sets - 10 reps  - Diaphragmatic Breathing to Reduce Intra-abdominal Pressure: Sit to Stand  - 1 x daily - 7 x weekly - 1 sets - 5-10 reps  - Squat with Chair Touch  - 1 x daily - 7 x weekly - 1 sets - 5-10 reps  - Supine Butterfly Groin Stretch  - 1 x daily - 7 x weekly - 1 sets - 10-20 reps       Charges: 1 TE, 2 TA      Total Timed Treatment: 33 min  Total Treatment Time: 35 min      Plan: Discharge with HEP. Patient was instructed to follow up with their physician should an exacerbation of symptoms arise.     Thank you for your referral. If you have any questions, please contact me at Dept: [phone].    Sincerely,  Electronically signed by therapist: Araceli Easton PT     Physician's certification required:  No                21st Century Cures Act Notice to Patient: Medical documents like this are made available to patients in the interest of transparency. However, be advised this is a medical document and it is intended as prcj-ou-edjr communication between your medical providers. This medical document may contain abbreviations, assessments, medical data, and results or other terms that are unfamiliar. Medical documents are intended to carry relevant information, facts as evident, and the clinical opinion of the practitioner. As such,  this medical document may be written in language that appears blunt or direct. You are encouraged to contact your medical provider and/or [facility] Experience if you have any questions about this medical document.

## (undated) NOTE — LETTER
[address], [facility]   Date:   11/2/2022     Name:   Pop Hood    YOB: 1990   MRN:   [id number]       [facility]? Elva the areas on your body where you feel the described sensations. Use the appropriate symbol. Tiffanie Derick the areas of radiation. Include all affected areas. Just to complete the picture, please draw in the face. ACHE:  ^ ^ ^   NUMBNESS:  0000   PINS & NEEDLES:  = = = =                              ^ ^ ^                       0000              = = = =                                    ^ ^ ^                       0000            = = = =      BURNING:  XXXX   STABBING: ////                  XXXX                ////                         XXXX          ////     Please elva the line below indicating your degree of pain right now  with 0 being no pain 10 being the worst pain possible.                                          0             1             2              3             4              5              6              7             8             9             10         Patient Signature:

## (undated) NOTE — LETTER
Date: 2/14/2023    Patient Name: Suha Olmstead          To Whom it may concern: This letter has been written at the patient's request. The above patient was seen at the [facility] for treatment of a medical condition. This patient should be excused from attending work/school from 2/15/23. The patient may return to work/school when fever/gi symptom free for 24 hours without medication with the following limitations none. Sincerely,      CLEMENTINA Hodgson [id number]  02/14/23  5:48 PM